# Patient Record
Sex: MALE | Race: BLACK OR AFRICAN AMERICAN | URBAN - METROPOLITAN AREA
[De-identification: names, ages, dates, MRNs, and addresses within clinical notes are randomized per-mention and may not be internally consistent; named-entity substitution may affect disease eponyms.]

---

## 2023-01-25 ENCOUNTER — INPATIENT (INPATIENT)
Facility: HOSPITAL | Age: 28
LOS: 7 days | Discharge: PSYCHIATRIC OR SUBSTANCE USE FACILITY | End: 2023-02-02
Attending: PSYCHIATRY & NEUROLOGY | Admitting: PSYCHIATRY & NEUROLOGY
Payer: MEDICAID

## 2023-01-25 VITALS — WEIGHT: 169.09 LBS | HEIGHT: 67 IN | TEMPERATURE: 98 F

## 2023-01-25 DIAGNOSIS — F20.1 DISORGANIZED SCHIZOPHRENIA: ICD-10-CM

## 2023-01-25 PROCEDURE — 99221 1ST HOSP IP/OBS SF/LOW 40: CPT

## 2023-01-25 RX ORDER — ACETAMINOPHEN 500 MG
650 TABLET ORAL EVERY 6 HOURS
Refills: 0 | Status: DISCONTINUED | OUTPATIENT
Start: 2023-01-25 | End: 2023-02-02

## 2023-01-25 RX ORDER — OLANZAPINE 15 MG/1
5 TABLET, FILM COATED ORAL ONCE
Refills: 0 | Status: DISCONTINUED | OUTPATIENT
Start: 2023-01-25 | End: 2023-02-02

## 2023-01-25 RX ORDER — RISPERIDONE 4 MG/1
4 TABLET ORAL AT BEDTIME
Refills: 0 | Status: DISCONTINUED | OUTPATIENT
Start: 2023-01-25 | End: 2023-01-30

## 2023-01-25 RX ORDER — OLANZAPINE 15 MG/1
5 TABLET, FILM COATED ORAL EVERY 6 HOURS
Refills: 0 | Status: DISCONTINUED | OUTPATIENT
Start: 2023-01-25 | End: 2023-02-02

## 2023-01-25 RX ORDER — BENZOCAINE AND MENTHOL 5; 1 G/100ML; G/100ML
1 LIQUID ORAL EVERY 4 HOURS
Refills: 0 | Status: DISCONTINUED | OUTPATIENT
Start: 2023-01-25 | End: 2023-02-02

## 2023-01-25 RX ORDER — TRAZODONE HCL 50 MG
50 TABLET ORAL AT BEDTIME
Refills: 0 | Status: DISCONTINUED | OUTPATIENT
Start: 2023-01-25 | End: 2023-02-02

## 2023-01-25 RX ORDER — SODIUM CHLORIDE 0.65 %
1 AEROSOL, SPRAY (ML) NASAL EVERY 4 HOURS
Refills: 0 | Status: DISCONTINUED | OUTPATIENT
Start: 2023-01-25 | End: 2023-02-02

## 2023-01-25 RX ADMIN — Medication 50 MILLIGRAM(S): at 20:39

## 2023-01-25 RX ADMIN — RISPERIDONE 4 MILLIGRAM(S): 4 TABLET ORAL at 20:39

## 2023-01-25 NOTE — BH INPATIENT PSYCHIATRY ASSESSMENT NOTE - RISK ASSESSMENT
Risk factors for self-harm / harm to others: sex, psychiatric diagnosis, substance use  Protective factors: inpatient and in a controlled setting with limited access to lethal means. not endorsing harm to self or others, able to contract for safety.

## 2023-01-25 NOTE — BH PATIENT PROFILE - NSBHTYPESAGG_PSY_A_CORE
pt reports hx C/A/H that tell him to kill people. Pt denies current C/A/H, denies intent or plan/thought

## 2023-01-25 NOTE — BH INPATIENT PSYCHIATRY ASSESSMENT NOTE - NSBHCHARTREVIEWVS_PSY_A_CORE FT
Vital Signs Last 24 Hrs  T(C): 36.7 (01-25-23 @ 16:14), Max: 36.7 (01-25-23 @ 16:14)  T(F): 98.1 (01-25-23 @ 16:14), Max: 98.1 (01-25-23 @ 16:14)  HR: --  BP: --  BP(mean): --  RR: --  SpO2: --    Orthostatic VS  01-25-23 @ 16:14  Lying BP: --/-- HR: --  Sitting BP: 129/83 HR: 82  Standing BP: 142/87 HR: 90  Site: --  Mode: --

## 2023-01-25 NOTE — BH INPATIENT PSYCHIATRY ASSESSMENT NOTE - MSE UNSTRUCTURED FT
Appearance: Dressed appropriately in casual clothing. fair hygiene + grooming .  Attitude / Behavior: Cooperative. cooperative. respectful.   Relatedness: fair   Eye contact: poor. pt looks down through most of the interview.   Motor: No abnormal movements, no psychomotor slowing or activation.  Speech: Regular rate. soft. at times with echolalia   Mood: "fine"   Affect: flat   Thought Process: concrete. at times illogical.    Thought Content: denies SI and HI   Perceptual: endorses AH, denies command type. denies VH. + paranoia   Insight: partial   Judgment: fair   Impulse control: appropriate thus far     Attention: fair  Gait: Intact.

## 2023-01-25 NOTE — BH INPATIENT PSYCHIATRY ASSESSMENT NOTE - HPI (INCLUDE ILLNESS QUALITY, SEVERITY, DURATION, TIMING, CONTEXT, MODIFYING FACTORS, ASSOCIATED SIGNS AND SYMPTOMS)
28 yo male, schizophrenia, domiciled with father, unemployed, prior hospitalizations, no known SA, denies medical hx, hx of extensive cannabis use, presented initially to [facility] after being found faced down in the woods, found to be psychotic, transferred to [facility]. Pt now transferred to [facility] due to testing + for COVID. At [facility], patient was described as cooperative, disorganized, making bizarre statements including “demons are telling [him] to do things.” Patient also noted an increase in his alcohol consumption. He endorsed AH that were intermittently command type to hurt others. Per reports received, patient’s father was hospitalized due to seizure and patient thought his father . Patient was started on Risperdal, titrated to 4mg qhs and pt agreed to trial of Invega Sustenna. Per handoff, prior auth obtained by [facility] for Invega Sustenna. On initial presentation to [facility], patient reports being found outside and being brought to [facility]. He is disorganized in his responses, states that he was very worried that his father had , but was recently reassured that he is actually alive. He reports a dx oh Schizophrenia, endorses cannabis use, reports having AH mostly “of random things,” but chart report suggests past CAH to hurt others. Patient adherent to Risperdal, was receiving 4mg qhs at [facility]. Patient also endorsing feeling nervous and some paranoia. Pt denies manic sx. Denies depressive sx. Denies si and hi. Reports casual alcohol use. denies withdrawals, past detox or rehabs.  28 yo male, schizophrenia, domiciled with father, unemployed, prior hospitalizations, no known SA, denies medical hx, hx of extensive cannabis use, presented initially to [facility] after being found faced down in the woods, found to be psychotic, transferred to [facility]. Pt now transferred to [facility] due to testing + for COVID. At [facility], patient was described as cooperative, disorganized, making bizarre statements including “demons are telling [him] to do things.” Patient also noted an increase in his alcohol consumption. He endorsed AH that were intermittently command type to hurt others. Per reports received, patient’s father was hospitalized due to seizure and patient thought his father . Patient was started on Risperdal, titrated to 4mg qhs and pt agreed to trial of Invega Sustenna. Per handoff, prior auth obtained by [facility] for Invega Sustenna. On initial presentation to [facility], patient reports being found outside and being brought to [facility]. He is disorganized in his responses, states that he was very worried that his father had , but was recently reassured that he is actually alive. He reports a dx of Schizophrenia, endorses cannabis use, reports having AH mostly “of random things,” but chart report suggests past CAH to hurt others. Patient adherent to Risperdal, was receiving 4mg qhs at [facility]. Patient also endorsing feeling nervous and some paranoia. Pt denies manic sx. Denies depressive sx. Denies si and hi. Reports casual alcohol use. denies withdrawals, past detox or rehabs.

## 2023-01-25 NOTE — BH INPATIENT PSYCHIATRY ASSESSMENT NOTE - CURRENT MEDICATION
MEDICATIONS  (STANDING):  risperiDONE   Tablet 4 milliGRAM(s) Oral at bedtime    MEDICATIONS  (PRN):  traZODone 50 milliGRAM(s) Oral at bedtime PRN insomnia

## 2023-01-25 NOTE — CHART NOTE - NSCHARTNOTEFT_GEN_A_CORE
Screening Medical Evaluation  Patient Admitted from: [facility] admitting diagnosis: Disorganized schizophrenia    PAST MEDICAL & SURGICAL HISTORY:  No pertinent past medical history      No significant past surgical history            Allergies    No Known Allergies    Intolerances        Social History:     FAMILY HISTORY:      MEDICATIONS  (STANDING):  risperiDONE   Tablet 4 milliGRAM(s) Oral at bedtime    MEDICATIONS  (PRN):  acetaminophen     Tablet .. 650 milliGRAM(s) Oral every 6 hours PRN Temp greater or equal to 38C (100.4F), Mild Pain (1 - 3), Moderate Pain (4 - 6)  benzocaine 15 mG/menthol 3.6 mG Lozenge 1 Lozenge Oral every 4 hours PRN sore throat  OLANZapine Disintegrating Tablet 5 milliGRAM(s) Oral every 6 hours PRN psychotic agitation  OLANZapine Injectable 5 milliGRAM(s) IntraMuscular once PRN psychotic agitation  sodium chloride 0.65% Nasal 1 Spray(s) Both Nostrils every 4 hours PRN Nasal Congestion  traZODone 50 milliGRAM(s) Oral at bedtime PRN insomnia      Vital Signs Last 24 Hrs  T(C): 36.7 (25 Jan 2023 16:14), Max: 36.7 (25 Jan 2023 16:14)  T(F): 98.1 (25 Jan 2023 16:14), Max: 98.1 (25 Jan 2023 16:14)  HR: 82  BP: 129/83  BP(mean): --  RR: 18  SpO2: --      CAPILLARY BLOOD GLUCOSE            PHYSICAL EXAM:  GENERAL: NAD, well-developed  HEAD:  Atraumatic, Normocephalic  EYES: EOMI, PERRLA, conjunctiva and sclera clear  NECK: Supple, No JVD  CHEST/LUNG: Clear to auscultation bilaterally; No wheeze  HEART: Regular rate and rhythm; No murmurs, rubs, or gallops  ABDOMEN: Soft, Nontender, Nondistended; Bowel sounds present  EXTREMITIES:  2+ Peripheral Pulses, No cyanosis, or edema  PSYCH: AAOx3  NEUROLOGY: non-focal  SKIN: No rashes or lesions    LABS:                    RADIOLOGY & ADDITIONAL TESTS:    Assessment and Plan:  27 year old male presenting today from [facility] to [facility] with admitting diagnosis of Disorganized schizophreniawith no pertinent PMH. Denies any fever, headaches, chills, chest pain, SOB, abdominal pain, N/V/D/C, dysuria. Physical exam unremarkable.  1)Disorganized schizophrenia: Follow care plan as per primary team.

## 2023-01-25 NOTE — BH INPATIENT PSYCHIATRY ASSESSMENT NOTE - NSICDXBHPRIMARYDX_PSY_ALL_CORE
[FreeTextEntry1] : follow up with surgeon  and oncology\par \par abd. pelvic ct  ordered\par \par monitor symptoms Schizophrenia   F20.9

## 2023-01-25 NOTE — BH PATIENT PROFILE - NSSUBRIEFINTERVENTION_PSY_A_CORE
If the patient has condition or takes medication with contraindications to drinking or substance use, it is recommended to abstain from drinking or substance use, or to drink or use substances below the recommended limits.

## 2023-01-25 NOTE — BH INPATIENT PSYCHIATRY ASSESSMENT NOTE - NSBHASSESSSUMMFT_PSY_ALL_CORE
26 yo male, schizophrenia, domiciled with father, unemployed, prior hospitalizations, no known SA, denies medical hx, hx of extensive cannabis use, presented initially to [facility] after being found faced down in the woods, found to be psychotic, transferred to [facility]. Pt now transferred to [facility] due to testing + for COVID.     Patient continues to endorse paranoia, AH, denies command type, has a flat affect, concrete thought process. patient on Risperdal 4mg qhs and will continue this. no CO indicated at this time. will explore VASQUEZ with patient (pt per chart review appears to have agreed to Invega Sustenna). hospitalization to assure safety, optimize treatment, coordinate aftercare. supportive tx for COVID sx ordered.      q15 min obs   Risperdal 4mg qhs   Olanzapine PRN for psychosis   trazodone PRN for insomnia

## 2023-01-26 DIAGNOSIS — U07.1 COVID-19: ICD-10-CM

## 2023-01-26 LAB
ALBUMIN SERPL ELPH-MCNC: 4.2 G/DL — SIGNIFICANT CHANGE UP (ref 3.3–5)
ALP SERPL-CCNC: 70 U/L — SIGNIFICANT CHANGE UP (ref 40–120)
ALT FLD-CCNC: 27 U/L — SIGNIFICANT CHANGE UP (ref 4–41)
ANION GAP SERPL CALC-SCNC: 10 MMOL/L — SIGNIFICANT CHANGE UP (ref 7–14)
AST SERPL-CCNC: 32 U/L — SIGNIFICANT CHANGE UP (ref 4–40)
BASOPHILS # BLD AUTO: 0.04 K/UL — SIGNIFICANT CHANGE UP (ref 0–0.2)
BASOPHILS NFR BLD AUTO: 0.7 % — SIGNIFICANT CHANGE UP (ref 0–2)
BILIRUB SERPL-MCNC: 1.1 MG/DL — SIGNIFICANT CHANGE UP (ref 0.2–1.2)
BUN SERPL-MCNC: 12 MG/DL — SIGNIFICANT CHANGE UP (ref 7–23)
CALCIUM SERPL-MCNC: 9.1 MG/DL — SIGNIFICANT CHANGE UP (ref 8.4–10.5)
CHLORIDE SERPL-SCNC: 101 MMOL/L — SIGNIFICANT CHANGE UP (ref 98–107)
CO2 SERPL-SCNC: 28 MMOL/L — SIGNIFICANT CHANGE UP (ref 22–31)
CREAT SERPL-MCNC: 0.99 MG/DL — SIGNIFICANT CHANGE UP (ref 0.5–1.3)
EGFR: 106 ML/MIN/1.73M2 — SIGNIFICANT CHANGE UP
EOSINOPHIL # BLD AUTO: 0.07 K/UL — SIGNIFICANT CHANGE UP (ref 0–0.5)
EOSINOPHIL NFR BLD AUTO: 1.3 % — SIGNIFICANT CHANGE UP (ref 0–6)
GLUCOSE SERPL-MCNC: 91 MG/DL — SIGNIFICANT CHANGE UP (ref 70–99)
HCT VFR BLD CALC: 42.9 % — SIGNIFICANT CHANGE UP (ref 39–50)
HGB BLD-MCNC: 13.7 G/DL — SIGNIFICANT CHANGE UP (ref 13–17)
IANC: 2.59 K/UL — SIGNIFICANT CHANGE UP (ref 1.8–7.4)
IMM GRANULOCYTES NFR BLD AUTO: 0.7 % — SIGNIFICANT CHANGE UP (ref 0–0.9)
LYMPHOCYTES # BLD AUTO: 2.26 K/UL — SIGNIFICANT CHANGE UP (ref 1–3.3)
LYMPHOCYTES # BLD AUTO: 42.2 % — SIGNIFICANT CHANGE UP (ref 13–44)
MCHC RBC-ENTMCNC: 30.5 PG — SIGNIFICANT CHANGE UP (ref 27–34)
MCHC RBC-ENTMCNC: 31.9 GM/DL — LOW (ref 32–36)
MCV RBC AUTO: 95.5 FL — SIGNIFICANT CHANGE UP (ref 80–100)
MONOCYTES # BLD AUTO: 0.36 K/UL — SIGNIFICANT CHANGE UP (ref 0–0.9)
MONOCYTES NFR BLD AUTO: 6.7 % — SIGNIFICANT CHANGE UP (ref 2–14)
NEUTROPHILS # BLD AUTO: 2.59 K/UL — SIGNIFICANT CHANGE UP (ref 1.8–7.4)
NEUTROPHILS NFR BLD AUTO: 48.4 % — SIGNIFICANT CHANGE UP (ref 43–77)
NRBC # BLD: 0 /100 WBCS — SIGNIFICANT CHANGE UP (ref 0–0)
NRBC # FLD: 0 K/UL — SIGNIFICANT CHANGE UP (ref 0–0)
PLATELET # BLD AUTO: 246 K/UL — SIGNIFICANT CHANGE UP (ref 150–400)
POTASSIUM SERPL-MCNC: 4.2 MMOL/L — SIGNIFICANT CHANGE UP (ref 3.5–5.3)
POTASSIUM SERPL-SCNC: 4.2 MMOL/L — SIGNIFICANT CHANGE UP (ref 3.5–5.3)
PROT SERPL-MCNC: 7 G/DL — SIGNIFICANT CHANGE UP (ref 6–8.3)
RBC # BLD: 4.49 M/UL — SIGNIFICANT CHANGE UP (ref 4.2–5.8)
RBC # FLD: 11.3 % — SIGNIFICANT CHANGE UP (ref 10.3–14.5)
SODIUM SERPL-SCNC: 139 MMOL/L — SIGNIFICANT CHANGE UP (ref 135–145)
WBC # BLD: 5.36 K/UL — SIGNIFICANT CHANGE UP (ref 3.8–10.5)
WBC # FLD AUTO: 5.36 K/UL — SIGNIFICANT CHANGE UP (ref 3.8–10.5)

## 2023-01-26 PROCEDURE — 99231 SBSQ HOSP IP/OBS SF/LOW 25: CPT

## 2023-01-26 PROCEDURE — 90853 GROUP PSYCHOTHERAPY: CPT

## 2023-01-26 PROCEDURE — 90832 PSYTX W PT 30 MINUTES: CPT | Mod: 59

## 2023-01-26 RX ORDER — PALIPERIDONE 1.5 MG/1
156 TABLET, EXTENDED RELEASE ORAL ONCE
Refills: 0 | Status: COMPLETED | OUTPATIENT
Start: 2023-02-02 | End: 2023-02-02

## 2023-01-26 RX ORDER — PALIPERIDONE 1.5 MG/1
234 TABLET, EXTENDED RELEASE ORAL ONCE
Refills: 0 | Status: COMPLETED | OUTPATIENT
Start: 2023-01-27 | End: 2023-01-27

## 2023-01-26 RX ADMIN — RISPERIDONE 4 MILLIGRAM(S): 4 TABLET ORAL at 20:28

## 2023-01-26 NOTE — BH SOCIAL WORK INITIAL PSYCHOSOCIAL EVALUATION - OTHER PAST PSYCHIATRIC HISTORY (INCLUDE DETAILS REGARDING ONSET, COURSE OF ILLNESS, INPATIENT/OUTPATIENT TREATMENT)
Per EMR pt has a hx of schizophrenia Per EMR pt has a hx of schizophrenia, paranoia, substance abuse, had 1 prior inpt hospitalization in 2019 for CAH, pt reports was connected to outpatient care, last seen with TAVARES with a provider MD and therapist several months ago. Per EMR pt has no hx of SA/SI, has a hx of AH, no VH, has paranoid delusions, hears voices, command telling him to hurt others but never acted upon them, pt has a hx of substance abuse, cannabis & ETOH abuse, has never completed an inpt rehab program, pt reports struggling with tx compliancy, has no hx of violent aggression, has a hx of impulsivity, poor judgement, pt has a legal hx of grand larceny during adolescence, has no medical hx

## 2023-01-26 NOTE — BH PSYCHOLOGY - GROUP THERAPY NOTE - NSPSYCHOLGRPCOGPT_PSY_A_CORE FT
Patient attended Cognitive Behavioral Therapy Group incorporating ACT-based concepts. The group started with a brief check-in, discussion about a mindfulness quote, followed by a mindful breath/body exercise, members were then encouraged to share their thoughts/reactions to this. Group focused on discussing the function/purpose of emotions; exploring how emotions motivate and organize us for action, communicate to/influence others and to ourselves. Group facilitator explained concepts, reinforced participation, and engaged patients in the discussion.

## 2023-01-26 NOTE — PSYCHIATRIC REHAB INITIAL EVALUATION - NSBHALCSUBTREAT_PSY_ALL_CORE
1 prior inpt hospitalization in 2019 for CAH, pt reports was connected to outpatient care, last seen with TAVARES with a provider MD and therapist several months ago.

## 2023-01-26 NOTE — BH PSYCHOLOGY - GROUP THERAPY NOTE - NSPSYCHOLGRPCOGINT_PSY_A_CORE FT
Cognitive/behavioral therapy, Acceptance and Commitment therapy, Emotion regulation/coping skills taught, Psychoed

## 2023-01-26 NOTE — PSYCHIATRIC REHAB INITIAL EVALUATION - NSBHPRRECOMMEND_PSY_ALL_CORE
Patient was seen individually by psych rehab staff to orient him to the unit and introduce him to psych rehab department and its functions as well as to assess functioning. Upon approach, patient was willing to meet with psych rehab staff. Patient was provided with a unit schedule and encouraged to attend daily psychiatric rehabilitation groups for improved insight and symptom management. Patient’s appearance was kempt and was observed to be dressed casually. Patient presents as calm and pleasant. Patient was engaged and cooperative. Patient was noted to be disorganized at times, but demonstrated partial insight and future-oriented thinking such as stating that he'd like to get  one day. When asked what brought him to the facility, patient stated that he thought his dad  because he had a heart attack and this combined with not taking his medication resulted in him "acting out", resulting in him being found face down in the woods. This corroborates the psychiatry assessment note which states that patient is a "28 yo male, schizophrenia, domiciled with father, unemployed, prior hospitalizations, no known SA, denies medical hx, hx of extensive cannabis use, presented initially to [facility] after being found faced down in the woods, found to be psychotic, transferred to [facility]. Pt now transferred to [facility] due to testing + for COVID. At [facility], patient was described as cooperative, disorganized, making bizarre statements including “demons are telling [him] to do things.” Patient also noted an increase in his alcohol consumption. He endorsed AH that were intermittently command type to hurt others. Per reports received, patient’s father was hospitalized due to seizure and patient thought his father . Patient was started on Risperdal, titrated to 4mg qhs and pt agreed to trial of Invega Sustenna. Per handoff, prior auth obtained by [facility] for Invega Sustenna. On initial presentation to [facility], patient reports being found outside and being brought to [facility]. He is disorganized in his responses, states that he was very worried that his father had , but was recently reassured that he is actually alive". Patient and psych were unable to identify a collaborative goal, therefore one was selected for the patient. Psych rehab staff will provide counseling and daily group therapy to assist patient in achieving therapeutic goals. Psych rehab staff will also continue to engage patient daily in order to build therapeutic rapport. Patient denies SI/HI/VH, but endorses AH. Patient stated the nature of his AH at this given time are not CAH, and are a mix of "good and bad voices" and when they are "bad" he doesn't listen to them. Psych rehab recommends that patient attend individual and group therapy for support, psycho-education and skill-integration as well as to facilitate progress towards specified goal.

## 2023-01-26 NOTE — BH PSYCHOLOGY - GROUP THERAPY NOTE - NSBHPSYCHOLRESPCOMMENT_PSY_A_CORE FT
The patient appeared adequately groomed and casually dressed. Pt appeared very engaged in the group as evidenced by his willingness to verbally communicate independently during the discussion. Pt read aloud from the worksheet and shared that he feels validated when learning about other’s experiencing similar emotions. Pt highlighted the ways in which people communicate their feelings non-verbally and how he is learning to “trust [his] gut” regarding reactions he has to others. Pt was supportive of members and agreed with much of what the group shared.  Speech was WNL. PT was oriented X3. Pt was appropriate with peers.

## 2023-01-27 PROCEDURE — 99231 SBSQ HOSP IP/OBS SF/LOW 25: CPT

## 2023-01-27 RX ADMIN — PALIPERIDONE 234 MILLIGRAM(S): 1.5 TABLET, EXTENDED RELEASE ORAL at 09:53

## 2023-01-27 RX ADMIN — RISPERIDONE 4 MILLIGRAM(S): 4 TABLET ORAL at 21:25

## 2023-01-27 NOTE — BH TREATMENT PLAN - NSTXDCOPNOINTERSW_PSY_ALL_CORE
SW met with pt, provided psycho-ed in context of realizing the importance of tx compliance for better functioning, pt shared recent stressors have been a trigger, SW encouraged pt to participate in groups on the unit to help promote better insight

## 2023-01-27 NOTE — BH TREATMENT PLAN - NSTXDCOPLKINTERSW_PSY_ALL_CORE
SW spoke with pt to discuss recent challenges he's been experiencing, pt shares he hasn't been connected to tx for the past several months, has stressors at home which has hiim dysregulated, asked for assistance with re-linking to outpt care

## 2023-01-27 NOTE — BH TREATMENT PLAN - NSTXPSYCHOINTERPR_PSY_ALL_CORE
Psych rehab recommends that patient attend individual and group therapy for support, psycho-education and skill-integration as well as to facilitate progress towards specified goal.

## 2023-01-27 NOTE — BH TREATMENT PLAN - NSTXPSYCHOGOAL_PSY_ALL_CORE
Will be able to report experiencing hallucinations to staff
Will verbalize 1 strategy to successfully cope with psychotic symptoms

## 2023-01-27 NOTE — BH TREATMENT PLAN - NSTXPSYCHOINTERRN_PSY_ALL_CORE
Assess pt for signs and symptoms psychosis and command auditory hallucinations, redirect and reorient as necessary, provide support, maintain safety, explore healthy coping skills, identify triggers, encourage pt to participate in groups and unit activities, administer and educate on ordered medications

## 2023-01-28 PROCEDURE — 99232 SBSQ HOSP IP/OBS MODERATE 35: CPT

## 2023-01-28 RX ADMIN — RISPERIDONE 4 MILLIGRAM(S): 4 TABLET ORAL at 20:10

## 2023-01-29 PROCEDURE — 99232 SBSQ HOSP IP/OBS MODERATE 35: CPT

## 2023-01-29 RX ADMIN — RISPERIDONE 4 MILLIGRAM(S): 4 TABLET ORAL at 20:23

## 2023-01-30 PROCEDURE — 99231 SBSQ HOSP IP/OBS SF/LOW 25: CPT

## 2023-01-30 PROCEDURE — 90832 PSYTX W PT 30 MINUTES: CPT

## 2023-01-30 RX ORDER — RISPERIDONE 4 MG/1
2 TABLET ORAL AT BEDTIME
Refills: 0 | Status: DISCONTINUED | OUTPATIENT
Start: 2023-01-30 | End: 2023-02-01

## 2023-01-30 RX ADMIN — RISPERIDONE 2 MILLIGRAM(S): 4 TABLET ORAL at 20:28

## 2023-01-31 LAB — SARS-COV-2 RNA SPEC QL NAA+PROBE: SIGNIFICANT CHANGE UP

## 2023-01-31 PROCEDURE — 99231 SBSQ HOSP IP/OBS SF/LOW 25: CPT

## 2023-01-31 RX ADMIN — RISPERIDONE 2 MILLIGRAM(S): 4 TABLET ORAL at 20:48

## 2023-02-01 LAB — SARS-COV-2 RNA SPEC QL NAA+PROBE: SIGNIFICANT CHANGE UP

## 2023-02-01 PROCEDURE — 90832 PSYTX W PT 30 MINUTES: CPT

## 2023-02-01 PROCEDURE — 99231 SBSQ HOSP IP/OBS SF/LOW 25: CPT

## 2023-02-01 RX ORDER — TRAZODONE HCL 50 MG
1 TABLET ORAL
Qty: 0 | Refills: 0 | DISCHARGE
Start: 2023-02-01

## 2023-02-01 RX ORDER — PALIPERIDONE 1.5 MG/1
156 TABLET, EXTENDED RELEASE ORAL
Qty: 1 | Refills: 0
Start: 2023-02-01

## 2023-02-01 RX ORDER — RISPERIDONE 4 MG/1
2 TABLET ORAL AT BEDTIME
Refills: 0 | Status: COMPLETED | OUTPATIENT
Start: 2023-02-01 | End: 2023-02-01

## 2023-02-01 RX ADMIN — RISPERIDONE 2 MILLIGRAM(S): 4 TABLET ORAL at 20:43

## 2023-02-01 NOTE — BH INPATIENT PSYCHIATRY DISCHARGE NOTE - NSDCMRMEDTOKEN_GEN_ALL_CORE_FT
Padmini Sustenna 156 mg/mL intramuscular suspension, extended release: 156 milligram(s) intramuscularly every 28 days. NEXT DUE 3/2/23   traZODone 50 mg oral tablet: 1 tab(s) orally once a day (at bedtime), As needed, insomnia

## 2023-02-01 NOTE — BH INPATIENT PSYCHIATRY DISCHARGE NOTE - NSBHDCRISKMITIGATE_PSY_ALL_CORE
Safety planning/Reduction in access to lethal methods (pills, firearms, etc)/Family/Other social support involvement/Long acting injectable medication/Medications targeting suicidality/non-suicidal self injurious behavior

## 2023-02-01 NOTE — BH INPATIENT PSYCHIATRY DISCHARGE NOTE - NSDCCPCAREPLAN_GEN_ALL_CORE_FT
PRINCIPAL DISCHARGE DIAGNOSIS  Diagnosis: Schizophrenia  Assessment and Plan of Treatment: Padmini Mckeon

## 2023-02-01 NOTE — BH DISCHARGE NOTE NURSING/SOCIAL WORK/PSYCH REHAB - NSDPFAC_GEN_ALL_CORE
[facility] - Unit SW2

## 2023-02-01 NOTE — BH PSYCHOLOGY - CLINICIAN PSYCHOTHERAPY NOTE - NSBHPSYCHOLNARRATIVE_PSY_A_CORE FT
Writer and Pt wore masks due to COVID precautions. Pt was alert and presented with fair hygiene and grooming. Pt reported that he was feeling “very good”, evidencing euthymic mood and congruent affect. Pt denied feeling anxious or depressed. Pt endorsed occasional AH, adding that he experiences these more as “good and bad thoughts” and does not engage with/pay attention to the more negative thoughts. Pt denied experiencing A/VH hallucinations during the session. His thought process was linear and goal directed. Speech was WNL; content was relevant to discussion, no paranoia evidenced. Pt denied current suicidal ideation, plan or intent. Pt did not endorse HI. Oriented x3. Fair insight and judgement demonstrated.      Session focused on discussing Pt’s thoughts/behaviors that bring him toward or away from his goals and values and distinguishing between the two. Pt highlighted his alcohol use as “holding [him] back” from engaging in self-care, adding that he strives to maintain a strong connection to his ania and to God, but finds it more difficult to do this when using substances. Writer and Pt explored the challenges of taking care of both his mind and body when more symptomatic, the importance of treating any physical illness, avoiding mood-altering substances, having balanced sleeping and eating habits, engaging in physical exercise, and brainstormed ways to organize a routine that incorporates these. Pt also identified that at times it can be intimidating to engage with health care providers, particularly when asking questions about medications and side effects. Writer provided psychoeducation about these medications (ex. injection vs. oral), how they work, and processed his past experiences of dealing with related stigma/judgment from friends. Writer provided Pt with support, validation, encouragement, and a safe space to share his thoughts and feelings. 
Writer and Pt wore masks due to COVID precautions. Pt was alert and presented with fair hygiene and grooming. Pt reported that he was feeling “better” detailing that he was moderately anxious and minimally depressed, feeling more comfortable on the unit, and socializing with other patients. Pt evidenced congruent affect. Pt denied experiencing A/V hallucinations during the session. His thought process was linear and goal directed. Speech was WNL; content was relevant to discussion. Pt denied current suicidal ideation, plan or intent. Pt did not endorse HI. Oriented x3. Fair insight and judgement demonstrated.      Session focused on discussing Pt’s marijuana and stimulant use through motivational interviewing techniques; particularly encouraging Pt to identify the function of the behavior, exploring the disadvantages of continuing use, advantages of change, optimism for change, and his intention to change. Writer and Pt discussed his use (among other less helpful coping strategies) as being a form of avoidance of uncomfortable internal experiences associated with anxiety. Pt expressed that much of this anxiety is directly related to social/interpersonal conflicts and school stressors, and that marijuana helps with his “social anxiety.” Pt also shared that he uses stimulants to help with productivity and focus better on schoolwork, while identifying that he has become overdependent on these substances and feels “much worse” when stopping them. Pt asserted that he hopes to more openly speak with his sister about their relationship and how drastically it has changed over the last year or so, adding that he doesn’t feel she fully understands or accepts the impacts that substances have had on his mental health. Pt detailed that his sister was the person who first introduced him to marijuana and that they often smoked together. Writer and Pt explored the ways he has tried considering the pressures she experiences from her high-stress career and how this unaddressed stress may be manifesting as anger toward him. Writer provided Pt with support, validation, encouragement, and a safe space to share his thoughts and feelings. 
Writer and Pt wore masks due to COVID precautions. Pt was alert and presented with fair hygiene and grooming. Pt reported that he was feeling “okay, but worried about [his] dad” reflecting on feeling safe and comfortable in the hospital but concerned about his father’s health. Pt denied feeling anxious or depressed. Pt evidenced euthymic mood, and congruent affect. Pt denied experiencing A/V hallucinations during the session. His thought process was linear and goal directed. Speech was WNL; content was relevant to discussion, no paranoia evidenced. Pt denied current suicidal ideation, plan or intent. Pt did not endorse HI. Oriented x3. Fair insight and judgement demonstrated.      Writer focused on establishing rapport with Pt and identifying his goals for treatment. Writer discussed parameters of treatment with Pt and discussed the limits of confidentiality. Session focused on exploring the stressors leading to his hospitalization. Pt detailed the impacts of his father’s relatively recent heart attack and additional health conditions which have been challenging for Pt. Pt noted that after speaking with his sister on the phone, and learning that his father is somewhat stable, he felt reassured. Pt explained that he has also found it difficult to remember to take his medications and would prefer an injection to taking pills. Writer and Pt discussed the impacts of using marijuana as a way of coping. Pt identified that it has been a “quick fix” but does not address what he is struggling with in the long term, reflecting on how this use impacts his AH and disorganization. Additionally, Pt expressed the role that his ania/spirituality plays in his life, adding that prayer and listening to gospel music is very helpful with tolerating uncomfortable internal experiences. Writer provided Pt with ACT workbook, along with support, validation, encouragement, and a safe space to share his thoughts and feelings.

## 2023-02-01 NOTE — BH INPATIENT PSYCHIATRY DISCHARGE NOTE - HPI (INCLUDE ILLNESS QUALITY, SEVERITY, DURATION, TIMING, CONTEXT, MODIFYING FACTORS, ASSOCIATED SIGNS AND SYMPTOMS)
26 yo male, schizophrenia, domiciled with father, unemployed, prior hospitalizations, no known SA, denies medical hx, hx of extensive cannabis use, presented initially to [facility] after being found faced down in the woods, found to be psychotic, transferred to [facility]. Pt now transferred to [facility] due to testing + for COVID. At [facility], patient was described as cooperative, disorganized, making bizarre statements including “demons are telling [him] to do things.” Patient also noted an increase in his alcohol consumption. He endorsed AH that were intermittently command type to hurt others. Per reports received, patient’s father was hospitalized due to seizure and patient thought his father . Patient was started on Risperdal, titrated to 4mg qhs and pt agreed to trial of Invega Sustenna. Per handoff, prior auth obtained by [facility] for Invega Sustenna. On initial presentation to [facility], patient reports being found outside and being brought to [facility]. He is disorganized in his responses, states that he was very worried that his father had , but was recently reassured that he is actually alive. He reports a dx of Schizophrenia, endorses cannabis use, reports having AH mostly “of random things,” but chart report suggests past CAH to hurt others. Patient adherent to Risperdal, was receiving 4mg qhs at [facility]. Patient also endorsing feeling nervous and some paranoia. Pt denies manic sx. Denies depressive sx. Denies si and hi. Reports casual alcohol use. denies withdrawals, past detox or rehabs.

## 2023-02-01 NOTE — BH DISCHARGE NOTE NURSING/SOCIAL WORK/PSYCH REHAB - DISCHARGE INSTRUCTIONS AFTERCARE APPOINTMENTS
In order to check the location, date, or time of your aftercare appointment, please refer to your Discharge Instructions Document given to you upon leaving the hospital.  If you have lost the instructions please call [phone]

## 2023-02-01 NOTE — BH DISCHARGE NOTE NURSING/SOCIAL WORK/PSYCH REHAB - NSCDUDCCRISIS_PSY_A_CORE
.  [facility] Response Crisis Hotline  [phone]  24 hour telephone crisis intervention and suicide prevention hotline concerned with all mental health issues/.  [facility]  [phone] [place] [address] [phone]   Hours:  Monday through Friday from 9 AM to 3 PM/.  [facility]  [phone] [place] [address]   [phone]   Hours: Monday through Friday from 10 AM to 4 PM

## 2023-02-01 NOTE — BH INPATIENT PSYCHIATRY DISCHARGE NOTE - OTHER PAST PSYCHIATRIC HISTORY (INCLUDE DETAILS REGARDING ONSET, COURSE OF ILLNESS, INPATIENT/OUTPATIENT TREATMENT)
Per EMR pt has a hx of schizophrenia, paranoia, substance abuse, had 1 prior inpt hospitalization in 2019 for CAH, pt reports was connected to outpatient care, last seen with [facility] with a provider MD and therapist several months ago. Per EMR pt has no hx of SA/SI, has a hx of AH, no VH, has paranoid delusions, hears voices, command telling him to hurt others but never acted upon them, pt has a hx of substance abuse, cannabis & ETOH abuse, has never completed an inpt rehab program, pt reports struggling with tx compliancy, has no hx of violent aggression, has a hx of impulsivity, poor judgement, pt has a legal hx of grand larceny during adolescence, has no medical hx

## 2023-02-01 NOTE — BH INPATIENT PSYCHIATRY DISCHARGE NOTE - NSBHDCHANDOFFFT_PSY_ALL_CORE
handoff and dc summary provided to [facility] inpatient unit. inpatient team can be contacted at [phone]

## 2023-02-01 NOTE — BH PSYCHOLOGY - CLINICIAN PSYCHOTHERAPY NOTE - NSBHPSYCHOLINT_PSY_A_CORE FT
Acceptance and Commitment therapy 

## 2023-02-01 NOTE — BH PSYCHOLOGY - CLINICIAN PSYCHOTHERAPY NOTE - TOKEN PULL-DIAGNOSIS
Primary Diagnosis:  Schizophrenia [F20.9]        Problem Dx:   COVID [U07.1]      

## 2023-02-01 NOTE — BH DISCHARGE NOTE NURSING/SOCIAL WORK/PSYCH REHAB - NSDCPRRECOMMEND_PSY_ALL_CORE
Carlos Montes(Attending)
Psych rehab recommends that patient receive psychoeducation to assist with symptoms, follow outpatient treatment as prescribed and be compliant with medication.

## 2023-02-01 NOTE — BH PSYCHOLOGY - CLINICIAN PSYCHOTHERAPY NOTE - NSBHPSYCHOLINT_PSY_A_CORE
Cognitive/behavioral therapy/Dialectical  Behavioral Therapy (DBT)/Dynamic issues addressed/Supported coping skills/Supportive therapy/other...

## 2023-02-01 NOTE — BH DISCHARGE NOTE NURSING/SOCIAL WORK/PSYCH REHAB - PATIENT PORTAL LINK FT
You can access the FollowMyHealth Patient Portal offered by  by registering at the following website: [email or web address]. By joining [facility]’s FollowMyHealth portal, you will also be able to view your health information using other applications (apps) compatible with our system.

## 2023-02-01 NOTE — BH DISCHARGE NOTE NURSING/SOCIAL WORK/PSYCH REHAB - NSDCPRGOAL_PSY_ALL_CORE
Upon approach, patient was pleasant and willing to meet with writer to discuss recovery progress. Patient was insightful and forthcoming throughout meeting. When asked about plans for after discharge, patient stated he plans to continue his care at [facility], and continue to use coping strategies such as spirituality to further improve his mental health. When asked to compare how he felt today to how he felt when first admitted, patient stated that "he feels good". Overall, patient was cooperative and feels optimistic about recovery. Patient denies VH/SI/HI, but endorses AH. Patient states that these voices are "good" and not the bad CAH he's had in the past. During hospitalization, patient was intermittently active in the milieu and attended some of groups. In groups patient was engaged and would participate frequently. Patient maintained good behavioral control on the unit and was compliant with treatment. Patient is kempt and has been seen dressed casually on the unit. Patient was compliant in safety planning and was provided with a Press-Ganey survey. By the end of hospitalization, patient did meet his goal of identifying 1 strategy to successfully cope with psychotic symptoms. Patient has made significant progress towards recovery, however still requires further care and discharge planning, therefore he will benefit from continuing treatment at [facility].

## 2023-02-02 VITALS — TEMPERATURE: 97 F | SYSTOLIC BLOOD PRESSURE: 135 MMHG | HEART RATE: 78 BPM | DIASTOLIC BLOOD PRESSURE: 88 MMHG

## 2023-02-02 PROCEDURE — 99231 SBSQ HOSP IP/OBS SF/LOW 25: CPT

## 2023-02-02 RX ADMIN — PALIPERIDONE 156 MILLIGRAM(S): 1.5 TABLET, EXTENDED RELEASE ORAL at 08:51

## 2023-02-02 NOTE — BH INPATIENT PSYCHIATRY PROGRESS NOTE - NSBHFUPINTERVALCCFT_PSY_A_CORE
follow up psychosis 	
follow up psychosis
reported feeling "alright"
follow up psychosis 
reported feeling "good"

## 2023-02-02 NOTE — BH INPATIENT PSYCHIATRY PROGRESS NOTE - NSBHMETABOLIC_PSY_ALL_CORE_FT
BMI:   HbA1c:   Glucose:   BP: 122/77 (01-31-23 @ 07:47) (122/77 - 122/77)  Lipid Panel: 
BMI:   HbA1c:   Glucose:   BP: 137/79 (01-26-23 @ 08:50) (137/79 - 137/79)  Lipid Panel: 
BMI:   HbA1c:   Glucose:   BP: 144/63 (01-28-23 @ 07:35) (144/63 - 144/63)  Lipid Panel: 
BMI:   HbA1c:   Glucose:   BP: 135/88 (02-02-23 @ 08:24) (122/77 - 135/88)  Lipid Panel: 
BMI:   HbA1c:   Glucose:   BP: 144/63 (01-28-23 @ 07:35) (137/79 - 144/63)  Lipid Panel: 
BMI:   HbA1c:   Glucose:   BP: 144/63 (01-28-23 @ 07:35) (144/63 - 144/63)  Lipid Panel: 
BMI:   HbA1c:   Glucose:   BP: 122/77 (01-31-23 @ 07:47) (122/77 - 122/77)  Lipid Panel: 
BMI:   HbA1c:   Glucose:   BP: 137/79 (01-26-23 @ 08:50) (137/79 - 137/79)  Lipid Panel:

## 2023-02-02 NOTE — BH INPATIENT PSYCHIATRY PROGRESS NOTE - NSBHFUPINTERVALHXFT_PSY_A_CORE
Chart reviewed including pertinent labs, imaging, ekg. case discussed with treatment team.  Over this interval: patient adherent to treatment. no behavioral issues in the milieu. Patient reports sleep is getting better. he states that he continues to have AH and that they are the "voice of god" telling him "good things." Patient denies command type hallucinations. denies SI and HI. pt amenable to second part of invega sustenna injection ordered for later this week 
Chart reviewed including pertinent labs, imaging, ekg. case discussed with treatment team.  Over this interval: patient in good behavioral control. he remains adherent to treatment without any reported side effects. pt continues to have AH, states they are "good voices," and non-command type. pt reports past experiences with "bad voices" but they usually occur when he is upset or frustrated, and they have not occurred in several weeks. pt finds medications to be helpful in assisting with his psychotic symptoms. he inquires about potential discharge soon and is amenable to either discharge or transfer back to [facility] and defers this to team to decide 
Chart reviewed including pertinent labs, imaging, ekg. case discussed with treatment team.  Over this interval: no behavioral issues. pt adjusting to milieu. he received Invega Sustenna 234mg IM today and tolerating well without complaints. he notes that he has not had AH this AM. Sleep fragmented per sleep log. counseling provided regarding proper sleep hygiene. pt visible in the milieu, respectful of peers and staff, attending groups. he is amenable to second part of loading Invega Sustenna next week 
f/up schizophrenia. VSS. calm, pleasant on approach. Patient continued to report ++AH, voices with Restorationism content, denied CAH to harm self/others. Patient reported fair sleep and appetite. no apparent distress. Patient tolerating medications, Denied SE. no td, eps or tremors observed/ reported. alert and oriented x 3; 
Chart reviewed including pertinent labs, imaging, ekg. case discussed with treatment team.  Over this interval: patient tested negative for COVID and now longer needs to quarantine and will be transferred to [facility] for continued treatment. pt received Invega Sustenna 156mg IM today tolerating well. pt endorses ongoing AH and says they are "positive voices," telling him "good things. Pt reports he is Zoroastrian and has been reciting prayers to preoccupy his time. denies VH. denies SI and HI. 
Chart reviewed including pertinent labs, imaging, ekg. case discussed with treatment team.  Over this interval: patient transferred from [facility] yesterday. he is adjusting to milieu. his face is beneath his sheets for the entirety of interview. he is calm. he is adherent to medications. he endorses ongoing AH states they are of "good things." He denies command type hallucinations and denies VH. denies thoughts of wanting to hurt himself or others. psychoed provided regarding invega sustenna. pt remains amenable to receiving it 
Chart reviewed including pertinent labs, imaging, ekg. case discussed with treatment team.  Over this interval: no behavioral issues. adherent to treatment. pt endorses  for "good voices" telling him positive affirmations. denies CAH. denies VH. denies suicidal ideation and hi. mood described as "okay." Sleep with early AM awakening. denies feelings of guilt, low energy. Patient amenable to transfer to [facility] for ongoing psychiatric care. he is due for loading Invega sustenna 156mg tomorrow and is in agreement with plan. he is visible in the milieu and keeps mostly to himself 
f/up schizophrenia. VSS. Patient reported++AH, voices stating "god is good", denied CAH to harm self/others. Patient reported fair sleep and appetite. no apparent distress. Patient tolerating medications, Denied SE. no td, eps or tremors observed/ reported. alert and oriented x 3;

## 2023-02-02 NOTE — BH INPATIENT PSYCHIATRY PROGRESS NOTE - NSDCCRITERIA_PSY_ALL_CORE
When pt is no longer an acute or imminent risk of harm to self or others, and is able to care for self safely, pt may then be discharged. 

## 2023-02-02 NOTE — BH INPATIENT PSYCHIATRY PROGRESS NOTE - NSTXDCOPLKINTERMD_PSY_ALL_CORE
coordinate with s/w 

## 2023-02-02 NOTE — BH INPATIENT PSYCHIATRY PROGRESS NOTE - NSBHATTESTBILLING_PSY_A_CORE
40005-Irxzrjrghr OBS or IP - low complexity OR 25-34 mins
50341-Knphdurkbn OBS or IP - low complexity OR 25-34 mins
21365-Bykedphlye OBS or IP - low complexity OR 25-34 mins
19303-Rnvopudkip OBS or IP - low complexity OR 25-34 mins
23535-Auuhpnvwiv OBS or IP - moderate complexity OR 35-49 mins
57751-Qahxmeiheb OBS or IP - low complexity OR 25-34 mins
84387-Azmqbekuli OBS or IP - moderate complexity OR 35-49 mins
76714-Bqahekxhue OBS or IP - low complexity OR 25-34 mins

## 2023-02-02 NOTE — BH INPATIENT PSYCHIATRY PROGRESS NOTE - NSTXDCOPNOINTERMD_PSY_ALL_CORE
coordinate with s/wVipin caputo 

## 2023-02-02 NOTE — BH INPATIENT PSYCHIATRY PROGRESS NOTE - NSBHCHARTREVIEWVS_PSY_A_CORE FT
Vital Signs Last 24 Hrs  T(C): 36.2 (02-02-23 @ 08:24), Max: 36.9 (02-01-23 @ 17:27)  T(F): 97.2 (02-02-23 @ 08:24), Max: 98.5 (02-01-23 @ 17:27)  HR: 78 (02-02-23 @ 08:24) (78 - 78)  BP: 135/88 (02-02-23 @ 08:24) (135/88 - 135/88)  BP(mean): --  RR: --  SpO2: --    Orthostatic VS  02-01-23 @ 07:54  Lying BP: --/-- HR: --  Sitting BP: 118/73 HR: 74  Standing BP: --/-- HR: --  Site: --  Mode: --  
Vital Signs Last 24 Hrs  T(C): 36.3 (01-31-23 @ 07:47), Max: 36.6 (01-30-23 @ 17:48)  T(F): 97.4 (01-31-23 @ 07:47), Max: 97.8 (01-30-23 @ 17:48)  HR: --  BP: 122/77 (01-31-23 @ 07:47) (122/77 - 122/77)  BP(mean): 66 (01-31-23 @ 07:47) (66 - 66)  RR: --  SpO2: --    Orthostatic VS  01-30-23 @ 07:47  Lying BP: --/-- HR: --  Sitting BP: 110/74 HR: 77  Standing BP: --/-- HR: --  Site: --  Mode: --  
Vital Signs Last 24 Hrs  T(C): 36.3 (01-26-23 @ 08:50), Max: 36.7 (01-25-23 @ 16:14)  T(F): 97.3 (01-26-23 @ 08:50), Max: 98.1 (01-25-23 @ 16:14)  HR: 86 (01-26-23 @ 08:50) (86 - 86)  BP: 137/79 (01-26-23 @ 08:50) (137/79 - 137/79)  BP(mean): --  RR: --  SpO2: --    Orthostatic VS  01-25-23 @ 16:14  Lying BP: --/-- HR: --  Sitting BP: 129/83 HR: 82  Standing BP: 142/87 HR: 90  Site: --  Mode: --  
Vital Signs Last 24 Hrs  T(C): 36.1 (02-01-23 @ 07:54), Max: 36.3 (01-31-23 @ 17:06)  T(F): 97 (02-01-23 @ 07:54), Max: 97.3 (01-31-23 @ 17:06)  HR: --  BP: --  BP(mean): --  RR: --  SpO2: --    Orthostatic VS  02-01-23 @ 07:54  Lying BP: --/-- HR: --  Sitting BP: 118/73 HR: 74  Standing BP: --/-- HR: --  Site: --  Mode: --  
Vital Signs Last 24 Hrs  T(C): 36.2 (01-27-23 @ 08:19), Max: 36.2 (01-27-23 @ 08:19)  T(F): 97.2 (01-27-23 @ 08:19), Max: 97.2 (01-27-23 @ 08:19)  HR: --  BP: --  BP(mean): --  RR: --  SpO2: --    Orthostatic VS  01-27-23 @ 08:19  Lying BP: --/-- HR: --  Sitting BP: 117/75 HR: 87  Standing BP: --/-- HR: --  Site: --  Mode: --  Orthostatic VS  01-25-23 @ 16:14  Lying BP: --/-- HR: --  Sitting BP: 129/83 HR: 82  Standing BP: 142/87 HR: 90  Site: --  Mode: --  
Vital Signs Last 24 Hrs  T(C): 36.1 (01-30-23 @ 07:47), Max: 36.9 (01-29-23 @ 17:17)  T(F): 96.9 (01-30-23 @ 07:47), Max: 98.5 (01-29-23 @ 17:17)  HR: --  BP: --  BP(mean): --  RR: --  SpO2: --    Orthostatic VS  01-30-23 @ 07:47  Lying BP: --/-- HR: --  Sitting BP: 110/74 HR: 77  Standing BP: --/-- HR: --  Site: --  Mode: --  Orthostatic VS  01-29-23 @ 07:46  Lying BP: --/-- HR: --  Sitting BP: 140/69 HR: 84  Standing BP: --/-- HR: --  Site: --  Mode: --  
Vital Signs Last 24 Hrs  T(C): 36.3 (01-28-23 @ 07:35), Max: 36.4 (01-27-23 @ 17:28)  T(F): 97.3 (01-28-23 @ 07:35), Max: 97.6 (01-27-23 @ 17:28)  HR: 86 (01-28-23 @ 07:35) (86 - 86)  BP: 144/63 (01-28-23 @ 07:35) (144/63 - 144/63)  BP(mean): --  RR: --  SpO2: --    Orthostatic VS  01-27-23 @ 08:19  Lying BP: --/-- HR: --  Sitting BP: 117/75 HR: 87  Standing BP: --/-- HR: --  Site: --  Mode: --  
Vital Signs Last 24 Hrs  T(C): 36.7 (01-29-23 @ 07:46), Max: 36.7 (01-29-23 @ 07:46)  T(F): 98.1 (01-29-23 @ 07:46), Max: 98.1 (01-29-23 @ 07:46)  HR: --  BP: --  BP(mean): --  RR: --  SpO2: --    Orthostatic VS  01-29-23 @ 07:46  Lying BP: --/-- HR: --  Sitting BP: 140/69 HR: 84  Standing BP: --/-- HR: --  Site: --  Mode: --

## 2023-02-02 NOTE — BH INPATIENT PSYCHIATRY PROGRESS NOTE - NSTXPSYCHOINTERMD_PSY_ALL_CORE
Padmini rowe 
risperdal 
Padmini rowe 
Padmini rowe 
risperdal 
Padmini rowe 

## 2023-02-02 NOTE — BH INPATIENT PSYCHIATRY PROGRESS NOTE - NSBHASSESSSUMMFT_PSY_ALL_CORE
28 yo male, schizophrenia, domiciled with father, unemployed, prior hospitalizations, no known SA, denies medical hx, hx of extensive cannabis use, presented initially to [facility] after being found faced down in the woods, found to be psychotic, transferred to [facility]. Pt now transferred to [facility] due to testing + for COVID.     pt adjusting to milieu. endorsing AH non-command type. flat affect. he is amenable to invega sustenna. will order 234mg invega sustenna for tomorrow       q15 min obs   Risperdal 4mg qhs   invega sustenna 234mg IM on 1/27/23 followed by 156mg on 2/1/23  Olanzapine PRN for psychosis   trazodone PRN for insomnia 
28 yo male, schizophrenia, domiciled with father, unemployed, prior hospitalizations, no known SA, denies medical hx, hx of extensive cannabis use, presented initially to [facility] after being found faced down in the woods, found to be psychotic, transferred to [facility]. Pt now transferred to [facility] due to testing + for COVID.     no behavioral issues. ongoing AH that he describes as "good" and non-command type. pt adherent to treatment. due for 2nd Invega Sustenna on 2/2/23. plan as below. will dc Risperdal once pt receives second Invega Sustenna injection       q15 min obs   Risperdal 2mg qhs PO  invega sustenna 156mg on 2/2/23  Olanzapine PRN for psychosis   trazodone PRN for insomnia 
28 yo male, schizophrenia, domiciled with father, unemployed, prior hospitalizations, no known SA, denies medical hx, hx of extensive cannabis use, presented initially to [facility] after being found faced down in the woods, found to be psychotic, transferred to [facility]. Pt now transferred to [facility] due to testing + for COVID.     received VASQUEZ Invega Sustenna 156mg today and tolerating well. ongoing AH. pt no longer needs to quarantine and will be transferred back to [facility] for ongoing treatment.       invega sustenna 156mg received on 2/2/23  
28 yo male, schizophrenia, domiciled with father, unemployed, prior hospitalizations, no known SA, denies medical hx, hx of extensive cannabis use, presented initially to [facility] after being found faced down in the woods, found to be psychotic, transferred to [facility]. Pt now transferred to [facility] due to testing + for COVID.     pt with improving sx of psychosis. sleep fragmented. pt received Invega Sustenna IM today. next due 2/2/23. will decrease Risperdal to 2mg as a bridge until next Invega Sustenna injection and then will d/c Risperdal PO    on assessment, patient in good behavioral control, reported AH, noncommand, reported sleeping well. no apparent distress.       q15 min obs   Risperdal 2mg qhs PO  invega sustenna 156mg on 2/2/23  Olanzapine PRN for psychosis   trazodone PRN for insomnia 
28 yo male, schizophrenia, domiciled with father, unemployed, prior hospitalizations, no known SA, denies medical hx, hx of extensive cannabis use, presented initially to [facility] after being found faced down in the woods, found to be psychotic, transferred to [facility]. Pt now transferred to [facility] due to testing + for COVID.     pt with improving sx of psychosis. sleep fragmented. pt received Invega Sustenna IM today. next due 2/2/23. will decrease Risperdal to 2mg as a bridge until next Invega Sustenna injection and then will d/c Risperdal PO    on assessment, patient in good behavioral control, reported AH, non-command, reported sleeping well. no apparent distress.       q15 min obs   Risperdal 2mg qhs PO  invega sustenna 156mg on 2/2/23  Olanzapine PRN for psychosis   trazodone PRN for insomnia 
28 yo male, schizophrenia, domiciled with father, unemployed, prior hospitalizations, no known SA, denies medical hx, hx of extensive cannabis use, presented initially to [facility] after being found faced down in the woods, found to be psychotic, transferred to [facility]. Pt now transferred to [facility] due to testing + for COVID.     pt with improving sx of psychosis. sleep fragmented. pt received Invega Sustenna IM today. next due 2/2/23. will decrease Risperdal to 2mg as a bridge until next Invega Sustenna injection and then will d/c Risperdal PO      q15 min obs   Risperdal 2mg qhs PO  invega sustenna 156mg on 2/2/23  Olanzapine PRN for psychosis   trazodone PRN for insomnia 
26 yo male, schizophrenia, domiciled with father, unemployed, prior hospitalizations, no known SA, denies medical hx, hx of extensive cannabis use, presented initially to [facility] after being found faced down in the woods, found to be psychotic, transferred to [facility]. Pt now transferred to [facility] due to testing + for COVID.     pt endorsing AH denies CAH. due for invega sustenna loading dose tomorrow. COVID test ordered for this AM and if negative, pt can come off quarantine. plan as below. tentative plans to transfer back to [facility] once pt no longer needs to quarantine. disposition planning with complexities related to patient's living situation - patient's father is appears to be terminally ill and pt lives with father. his father is in the hospital and patient's family is requesting referral for supportive housing       q15 min obs   Risperdal 2mg qhs PO  invega sustenna 156mg on 2/2/23  Olanzapine PRN for psychosis   trazodone PRN for insomnia 
28 yo male, schizophrenia, domiciled with father, unemployed, prior hospitalizations, no known SA, denies medical hx, hx of extensive cannabis use, presented initially to [facility] after being found faced down in the woods, found to be psychotic, transferred to [facility]. Pt now transferred to [facility] due to testing + for COVID.     pt with improving sx of psychosis. sleep fragmented. pt received Invega Sustenna IM today. next due 2/2/23. will decrease Risperdal to 2mg as a bridge until next Invega Sustenna injection and then will d/c Risperdal PO    continues to endorse non-command type AH. denies VH. he is in appropriate behavioral control. mostly to himself and likely with some negative symptoms. plan as below       q15 min obs   Risperdal 2mg qhs PO  invega sustenna 156mg on 2/2/23  Olanzapine PRN for psychosis   trazodone PRN for insomnia

## 2023-02-02 NOTE — BH INPATIENT PSYCHIATRY PROGRESS NOTE - CURRENT MEDICATION
MEDICATIONS  (STANDING):  risperiDONE   Tablet 2 milliGRAM(s) Oral at bedtime    MEDICATIONS  (PRN):  acetaminophen     Tablet .. 650 milliGRAM(s) Oral every 6 hours PRN Temp greater or equal to 38C (100.4F), Mild Pain (1 - 3), Moderate Pain (4 - 6)  benzocaine 15 mG/menthol 3.6 mG Lozenge 1 Lozenge Oral every 4 hours PRN sore throat  OLANZapine Disintegrating Tablet 5 milliGRAM(s) Oral every 6 hours PRN psychotic agitation  OLANZapine Injectable 5 milliGRAM(s) IntraMuscular once PRN psychotic agitation  sodium chloride 0.65% Nasal 1 Spray(s) Both Nostrils every 4 hours PRN Nasal Congestion  traZODone 50 milliGRAM(s) Oral at bedtime PRN insomnia  
MEDICATIONS  (STANDING):  risperiDONE   Tablet 4 milliGRAM(s) Oral at bedtime    MEDICATIONS  (PRN):  acetaminophen     Tablet .. 650 milliGRAM(s) Oral every 6 hours PRN Temp greater or equal to 38C (100.4F), Mild Pain (1 - 3), Moderate Pain (4 - 6)  benzocaine 15 mG/menthol 3.6 mG Lozenge 1 Lozenge Oral every 4 hours PRN sore throat  OLANZapine Disintegrating Tablet 5 milliGRAM(s) Oral every 6 hours PRN psychotic agitation  OLANZapine Injectable 5 milliGRAM(s) IntraMuscular once PRN psychotic agitation  sodium chloride 0.65% Nasal 1 Spray(s) Both Nostrils every 4 hours PRN Nasal Congestion  traZODone 50 milliGRAM(s) Oral at bedtime PRN insomnia  
MEDICATIONS  (STANDING):  risperiDONE   Tablet 2 milliGRAM(s) Oral at bedtime    MEDICATIONS  (PRN):  acetaminophen     Tablet .. 650 milliGRAM(s) Oral every 6 hours PRN Temp greater or equal to 38C (100.4F), Mild Pain (1 - 3), Moderate Pain (4 - 6)  benzocaine 15 mG/menthol 3.6 mG Lozenge 1 Lozenge Oral every 4 hours PRN sore throat  OLANZapine Disintegrating Tablet 5 milliGRAM(s) Oral every 6 hours PRN psychotic agitation  OLANZapine Injectable 5 milliGRAM(s) IntraMuscular once PRN psychotic agitation  sodium chloride 0.65% Nasal 1 Spray(s) Both Nostrils every 4 hours PRN Nasal Congestion  traZODone 50 milliGRAM(s) Oral at bedtime PRN insomnia  
MEDICATIONS  (STANDING):  risperiDONE   Tablet 4 milliGRAM(s) Oral at bedtime    MEDICATIONS  (PRN):  acetaminophen     Tablet .. 650 milliGRAM(s) Oral every 6 hours PRN Temp greater or equal to 38C (100.4F), Mild Pain (1 - 3), Moderate Pain (4 - 6)  benzocaine 15 mG/menthol 3.6 mG Lozenge 1 Lozenge Oral every 4 hours PRN sore throat  OLANZapine Disintegrating Tablet 5 milliGRAM(s) Oral every 6 hours PRN psychotic agitation  OLANZapine Injectable 5 milliGRAM(s) IntraMuscular once PRN psychotic agitation  sodium chloride 0.65% Nasal 1 Spray(s) Both Nostrils every 4 hours PRN Nasal Congestion  traZODone 50 milliGRAM(s) Oral at bedtime PRN insomnia  
MEDICATIONS  (STANDING):    MEDICATIONS  (PRN):  acetaminophen     Tablet .. 650 milliGRAM(s) Oral every 6 hours PRN Temp greater or equal to 38C (100.4F), Mild Pain (1 - 3), Moderate Pain (4 - 6)  benzocaine 15 mG/menthol 3.6 mG Lozenge 1 Lozenge Oral every 4 hours PRN sore throat  OLANZapine Disintegrating Tablet 5 milliGRAM(s) Oral every 6 hours PRN psychotic agitation  OLANZapine Injectable 5 milliGRAM(s) IntraMuscular once PRN psychotic agitation  sodium chloride 0.65% Nasal 1 Spray(s) Both Nostrils every 4 hours PRN Nasal Congestion  traZODone 50 milliGRAM(s) Oral at bedtime PRN insomnia  
MEDICATIONS  (STANDING):  risperiDONE   Tablet 4 milliGRAM(s) Oral at bedtime    MEDICATIONS  (PRN):  acetaminophen     Tablet .. 650 milliGRAM(s) Oral every 6 hours PRN Temp greater or equal to 38C (100.4F), Mild Pain (1 - 3), Moderate Pain (4 - 6)  benzocaine 15 mG/menthol 3.6 mG Lozenge 1 Lozenge Oral every 4 hours PRN sore throat  OLANZapine Disintegrating Tablet 5 milliGRAM(s) Oral every 6 hours PRN psychotic agitation  OLANZapine Injectable 5 milliGRAM(s) IntraMuscular once PRN psychotic agitation  sodium chloride 0.65% Nasal 1 Spray(s) Both Nostrils every 4 hours PRN Nasal Congestion  traZODone 50 milliGRAM(s) Oral at bedtime PRN insomnia  

## 2023-02-02 NOTE — BH INPATIENT PSYCHIATRY PROGRESS NOTE - NSTXPSYCHOGOAL_PSY_ALL_CORE
Will verbalize 1 strategy to successfully cope with psychotic symptoms
Will be able to report experiencing hallucinations to staff
Will verbalize 1 strategy to successfully cope with psychotic symptoms

## 2023-02-02 NOTE — BH INPATIENT PSYCHIATRY PROGRESS NOTE - PRN MEDS
MEDICATIONS  (PRN):  acetaminophen     Tablet .. 650 milliGRAM(s) Oral every 6 hours PRN Temp greater or equal to 38C (100.4F), Mild Pain (1 - 3), Moderate Pain (4 - 6)  benzocaine 15 mG/menthol 3.6 mG Lozenge 1 Lozenge Oral every 4 hours PRN sore throat  OLANZapine Disintegrating Tablet 5 milliGRAM(s) Oral every 6 hours PRN psychotic agitation  OLANZapine Injectable 5 milliGRAM(s) IntraMuscular once PRN psychotic agitation  sodium chloride 0.65% Nasal 1 Spray(s) Both Nostrils every 4 hours PRN Nasal Congestion  traZODone 50 milliGRAM(s) Oral at bedtime PRN insomnia  

## 2023-02-02 NOTE — BH INPATIENT PSYCHIATRY PROGRESS NOTE - NSBHCONSULTIPREASON_PSY_A_CORE
danger to self; mental illness expected to respond to inpatient care
other reason
danger to self; mental illness expected to respond to inpatient care

## 2023-02-02 NOTE — BH INPATIENT PSYCHIATRY PROGRESS NOTE - MSE UNSTRUCTURED FT
Appearance: Dressed appropriately in casual clothing. fair hygiene + grooming .  Attitude / Behavior: Cooperative. cooperative. pleasant.   Relatedness: fair   Eye contact: poor.   Motor: No abnormal movements, no psychomotor slowing or activation.  Speech: Regular rate. soft.   Mood: "fine"   Affect: flat   Thought Process: concrete. at times illogical.    Thought Content: denies SI and HI   Perceptual: denies AVH    Insight: partial   Judgment: improving   Impulse control: appropriate thus far     Attention: fair  Gait: Intact. 
Appearance: Dressed appropriately in casual clothing. fair hygiene + grooming .  Attitude / Behavior: Cooperative. cooperative. respectful.   Relatedness: fair   Eye contact: poor.   Motor: No abnormal movements, no psychomotor slowing or activation.  Speech: Regular rate. soft.   Mood: "ok"   Affect: flat   Thought Process: concrete. at times illogical.    Thought Content: denies SI and HI   Perceptual: endorses AH, denies command type. denies VH. + paranoia   Insight: partial   Judgment: fair   Impulse control: appropriate thus far     Attention: fair  Gait: Intact. 
Appearance: Dressed appropriately in casual clothing. good hygiene + grooming .  Attitude / Behavior: Cooperative. cooperative. polite   Relatedness: fair   Eye contact: fair  Motor: No abnormal movements, no psychomotor slowing or activation.  Speech: Regular rate. spontaneous   Mood: "okay"    Affect: neutral. stable.   Thought Process: organized, goal-directed, mostly logical.   Thought Content: denies SI and HI   Perceptual: denies VH, +AH, noncommand type   Insight: fair-to-good   Judgment: improving   Impulse control: appropriate thus far     Attention: appropriate  Gait: Intact. 
Appearance: Dressed appropriately in casual clothing. good hygiene + grooming .  Attitude / Behavior: Cooperative. cooperative. polite   Relatedness: fair   Eye contact: fair  Motor: No abnormal movements, no psychomotor slowing or activation.  Speech: Regular rate. spontaneous   Mood: "fine"    Affect: neutral. stable.   Thought Process: organized, goal-directed, mostly logical.   Thought Content: denies SI and HI   Perceptual: denies VH, +AH, noncommand type   Insight: fair-to-good   Judgment: improving   Impulse control: appropriate thus far     Attention: appropriate  Gait: Intact. 
Appearance: Dressed appropriately in casual clothing. fair hygiene + grooming .  Attitude / Behavior: Cooperative. cooperative. pleasant.   Relatedness: fair   Eye contact: poor.   Motor: No abnormal movements, no psychomotor slowing or activation.  Speech: Regular rate. soft.   Mood: "good"   Affect: flat  Thought Process: concrete. mostly logical   Thought Content: denies SI and HI   Perceptual: denies VH, +AH, noncommand type   Insight: partial   Judgment: improving   Impulse control: appropriate thus far     Attention: fair  Gait: Intact. 
Appearance: Dressed appropriately in casual clothing. fair hygiene + grooming .  Attitude / Behavior: Cooperative. cooperative. pleasant.   Relatedness: fair   Eye contact: poor.   Motor: No abnormal movements, no psychomotor slowing or activation.  Speech: Regular rate. soft.   Mood: "good"   Affect: constricted  Thought Process: concrete. at times illogical.    Thought Content: denies SI and HI   Perceptual: denies VH, +AH, noncommand  Insight: partial   Judgment: improving   Impulse control: appropriate thus far     Attention: fair  Gait: Intact. 
Appearance: Dressed appropriately in casual clothing. good hygiene + grooming .  Attitude / Behavior: Cooperative. cooperative. pleasant.   Relatedness: fair   Eye contact: fair  Motor: No abnormal movements, no psychomotor slowing or activation.  Speech: Regular rate. spontaneous   Mood: "Fine"   Affect: neutral. stable.   Thought Process: organized, goal-directed, mostly logical.   Thought Content: denies SI and HI   Perceptual: denies VH, +AH, noncommand type   Insight: fair-to-good   Judgment: improving   Impulse control: appropriate thus far     Attention: appropriate  Gait: Intact. 
Appearance: Dressed appropriately in casual clothing. fair hygiene + grooming .  Attitude / Behavior: Cooperative. cooperative. pleasant.   Relatedness: fair   Eye contact: poor.   Motor: No abnormal movements, no psychomotor slowing or activation.  Speech: Regular rate. soft.   Mood: "alright"   Affect: constricted  Thought Process: concrete.   Thought Content: denies SI and HI   Perceptual: denies VH, +AH, noncommand  Insight: partial   Judgment: improving   Impulse control: appropriate thus far     Attention: fair  Gait: Intact.

## 2023-02-02 NOTE — BH INPATIENT PSYCHIATRY PROGRESS NOTE - NSBHATTESTTYPEVISIT_PSY_A_CORE
MABEL without on-site Attending supervision
Attending Only
MABEL without on-site Attending supervision
Attending Only

## 2023-02-02 NOTE — BH INPATIENT PSYCHIATRY PROGRESS NOTE - NSICDXBHSECONDARYDX_PSY_ALL_CORE
COVID   U07.1  

## 2023-03-15 NOTE — BH INPATIENT PSYCHIATRY DISCHARGE NOTE - NSTOBACCOUSAGEY/N_GEN_A_CS
This prescription has been approved and sent to   [id number] - [place], [place] - [address]  [address] [place] [address]  Phone: [phone] Fax: [phone]     No

## 2023-03-23 ENCOUNTER — INPATIENT (INPATIENT)
Facility: HOSPITAL | Age: 28
LOS: 22 days | Discharge: HOME | DRG: 750 | End: 2023-04-15
Attending: PSYCHIATRY & NEUROLOGY | Admitting: PSYCHIATRY & NEUROLOGY
Payer: MEDICAID

## 2023-03-23 VITALS
SYSTOLIC BLOOD PRESSURE: 169 MMHG | TEMPERATURE: 98 F | RESPIRATION RATE: 20 BRPM | DIASTOLIC BLOOD PRESSURE: 112 MMHG | HEART RATE: 121 BPM | OXYGEN SATURATION: 100 % | HEIGHT: 67 IN

## 2023-03-23 DIAGNOSIS — F23 BRIEF PSYCHOTIC DISORDER: ICD-10-CM

## 2023-03-23 DIAGNOSIS — F20.1 DISORGANIZED SCHIZOPHRENIA: ICD-10-CM

## 2023-03-23 LAB
ALBUMIN SERPL ELPH-MCNC: 3.9 G/DL — SIGNIFICANT CHANGE UP (ref 3.3–5)
ALP SERPL-CCNC: 72 U/L — SIGNIFICANT CHANGE UP (ref 40–120)
ALT FLD-CCNC: 45 U/L — SIGNIFICANT CHANGE UP (ref 12–78)
AMPHET UR-MCNC: NEGATIVE — SIGNIFICANT CHANGE UP
ANION GAP SERPL CALC-SCNC: 8 MMOL/L — SIGNIFICANT CHANGE UP (ref 5–17)
APAP SERPL-MCNC: < 2 UG/ML (ref 10–30)
APPEARANCE UR: CLEAR — SIGNIFICANT CHANGE UP
AST SERPL-CCNC: 31 U/L — SIGNIFICANT CHANGE UP (ref 15–37)
BARBITURATES UR SCN-MCNC: NEGATIVE — SIGNIFICANT CHANGE UP
BASOPHILS # BLD AUTO: 0.04 K/UL — SIGNIFICANT CHANGE UP (ref 0–0.2)
BASOPHILS NFR BLD AUTO: 0.5 % — SIGNIFICANT CHANGE UP (ref 0–2)
BENZODIAZ UR-MCNC: NEGATIVE — SIGNIFICANT CHANGE UP
BILIRUB SERPL-MCNC: 0.8 MG/DL — SIGNIFICANT CHANGE UP (ref 0.2–1.2)
BILIRUB UR-MCNC: NEGATIVE — SIGNIFICANT CHANGE UP
BUN SERPL-MCNC: 10 MG/DL — SIGNIFICANT CHANGE UP (ref 7–23)
CALCIUM SERPL-MCNC: 9.3 MG/DL — SIGNIFICANT CHANGE UP (ref 8.5–10.1)
CHLORIDE SERPL-SCNC: 108 MMOL/L — SIGNIFICANT CHANGE UP (ref 96–108)
CO2 SERPL-SCNC: 24 MMOL/L — SIGNIFICANT CHANGE UP (ref 22–31)
COCAINE METAB.OTHER UR-MCNC: NEGATIVE — SIGNIFICANT CHANGE UP
COLOR SPEC: YELLOW — SIGNIFICANT CHANGE UP
CREAT SERPL-MCNC: 1.16 MG/DL — SIGNIFICANT CHANGE UP (ref 0.5–1.3)
DIFF PNL FLD: NEGATIVE — SIGNIFICANT CHANGE UP
EGFR: 88 ML/MIN/1.73M2 — SIGNIFICANT CHANGE UP
EOSINOPHIL # BLD AUTO: 0.15 K/UL — SIGNIFICANT CHANGE UP (ref 0–0.5)
EOSINOPHIL NFR BLD AUTO: 1.7 % — SIGNIFICANT CHANGE UP (ref 0–6)
ETHANOL SERPL-MCNC: <10 MG/DL — SIGNIFICANT CHANGE UP (ref 0–10)
FLUAV AG NPH QL: SIGNIFICANT CHANGE UP
FLUBV AG NPH QL: SIGNIFICANT CHANGE UP
GLUCOSE SERPL-MCNC: 125 MG/DL — HIGH (ref 70–99)
GLUCOSE UR QL: NEGATIVE — SIGNIFICANT CHANGE UP
HCT VFR BLD CALC: 44 % — SIGNIFICANT CHANGE UP (ref 39–50)
HGB BLD-MCNC: 14.4 G/DL — SIGNIFICANT CHANGE UP (ref 13–17)
IMM GRANULOCYTES NFR BLD AUTO: 0.3 % — SIGNIFICANT CHANGE UP (ref 0–0.9)
KETONES UR-MCNC: NEGATIVE — SIGNIFICANT CHANGE UP
LEUKOCYTE ESTERASE UR-ACNC: NEGATIVE — SIGNIFICANT CHANGE UP
LYMPHOCYTES # BLD AUTO: 3.08 K/UL — SIGNIFICANT CHANGE UP (ref 1–3.3)
LYMPHOCYTES # BLD AUTO: 35.1 % — SIGNIFICANT CHANGE UP (ref 13–44)
MCHC RBC-ENTMCNC: 30.9 PG — SIGNIFICANT CHANGE UP (ref 27–34)
MCHC RBC-ENTMCNC: 32.7 GM/DL — SIGNIFICANT CHANGE UP (ref 32–36)
MCV RBC AUTO: 94.4 FL — SIGNIFICANT CHANGE UP (ref 80–100)
METHADONE UR-MCNC: NEGATIVE — SIGNIFICANT CHANGE UP
MONOCYTES # BLD AUTO: 0.82 K/UL — SIGNIFICANT CHANGE UP (ref 0–0.9)
MONOCYTES NFR BLD AUTO: 9.4 % — SIGNIFICANT CHANGE UP (ref 2–14)
NEUTROPHILS # BLD AUTO: 4.65 K/UL — SIGNIFICANT CHANGE UP (ref 1.8–7.4)
NEUTROPHILS NFR BLD AUTO: 53 % — SIGNIFICANT CHANGE UP (ref 43–77)
NITRITE UR-MCNC: NEGATIVE — SIGNIFICANT CHANGE UP
OPIATES UR-MCNC: NEGATIVE — SIGNIFICANT CHANGE UP
PCP SPEC-MCNC: SIGNIFICANT CHANGE UP
PCP UR-MCNC: NEGATIVE — SIGNIFICANT CHANGE UP
PH UR: 8 — SIGNIFICANT CHANGE UP (ref 5–8)
PLATELET # BLD AUTO: 259 K/UL — SIGNIFICANT CHANGE UP (ref 150–400)
POTASSIUM SERPL-MCNC: 3.8 MMOL/L — SIGNIFICANT CHANGE UP (ref 3.5–5.3)
POTASSIUM SERPL-SCNC: 3.8 MMOL/L — SIGNIFICANT CHANGE UP (ref 3.5–5.3)
PROT SERPL-MCNC: 7.9 GM/DL — SIGNIFICANT CHANGE UP (ref 6–8.3)
PROT UR-MCNC: NEGATIVE — SIGNIFICANT CHANGE UP
RBC # BLD: 4.66 M/UL — SIGNIFICANT CHANGE UP (ref 4.2–5.8)
RBC # FLD: 11.3 % — SIGNIFICANT CHANGE UP (ref 10.3–14.5)
RSV RNA NPH QL NAA+NON-PROBE: SIGNIFICANT CHANGE UP
SALICYLATES SERPL-MCNC: <1.7 MG/DL — LOW (ref 2.8–20)
SARS-COV-2 RNA SPEC QL NAA+PROBE: SIGNIFICANT CHANGE UP
SODIUM SERPL-SCNC: 140 MMOL/L — SIGNIFICANT CHANGE UP (ref 135–145)
SP GR SPEC: 1.01 — SIGNIFICANT CHANGE UP (ref 1.01–1.02)
THC UR QL: NEGATIVE — SIGNIFICANT CHANGE UP
UROBILINOGEN FLD QL: NEGATIVE — SIGNIFICANT CHANGE UP
WBC # BLD: 8.77 K/UL — SIGNIFICANT CHANGE UP (ref 3.8–10.5)
WBC # FLD AUTO: 8.77 K/UL — SIGNIFICANT CHANGE UP (ref 3.8–10.5)

## 2023-03-23 PROCEDURE — 81003 URINALYSIS AUTO W/O SCOPE: CPT

## 2023-03-23 PROCEDURE — 36415 COLL VENOUS BLD VENIPUNCTURE: CPT

## 2023-03-23 PROCEDURE — 99053 MED SERV 10PM-8AM 24 HR FAC: CPT

## 2023-03-23 PROCEDURE — 99285 EMERGENCY DEPT VISIT HI MDM: CPT

## 2023-03-23 PROCEDURE — 93010 ELECTROCARDIOGRAM REPORT: CPT

## 2023-03-23 PROCEDURE — 97162 PT EVAL MOD COMPLEX 30 MIN: CPT | Mod: GP

## 2023-03-23 PROCEDURE — 80307 DRUG TEST PRSMV CHEM ANLYZR: CPT

## 2023-03-23 PROCEDURE — 99233 SBSQ HOSP IP/OBS HIGH 50: CPT

## 2023-03-23 PROCEDURE — 80164 ASSAY DIPROPYLACETIC ACD TOT: CPT

## 2023-03-23 RX ORDER — DIPHENHYDRAMINE HCL 50 MG
50 CAPSULE ORAL ONCE
Refills: 0 | Status: DISCONTINUED | OUTPATIENT
Start: 2023-03-23 | End: 2023-04-14

## 2023-03-23 RX ORDER — MAGNESIUM HYDROXIDE 400 MG/1
30 TABLET, CHEWABLE ORAL DAILY
Refills: 0 | Status: DISCONTINUED | OUTPATIENT
Start: 2023-03-23 | End: 2023-04-14

## 2023-03-23 RX ORDER — HALOPERIDOL DECANOATE 100 MG/ML
5 INJECTION INTRAMUSCULAR ONCE
Refills: 0 | Status: DISCONTINUED | OUTPATIENT
Start: 2023-03-23 | End: 2023-04-14

## 2023-03-23 RX ORDER — PALIPERIDONE 1.5 MG/1
3 TABLET, EXTENDED RELEASE ORAL DAILY
Refills: 0 | Status: DISCONTINUED | OUTPATIENT
Start: 2023-03-23 | End: 2023-03-24

## 2023-03-23 RX ORDER — TRAZODONE HCL 50 MG
50 TABLET ORAL AT BEDTIME
Refills: 0 | Status: DISCONTINUED | OUTPATIENT
Start: 2023-03-23 | End: 2023-04-15

## 2023-03-23 RX ORDER — HALOPERIDOL DECANOATE 100 MG/ML
5 INJECTION INTRAMUSCULAR ONCE
Refills: 0 | Status: COMPLETED | OUTPATIENT
Start: 2023-03-23 | End: 2023-03-23

## 2023-03-23 RX ORDER — DIPHENHYDRAMINE HCL 50 MG
50 CAPSULE ORAL EVERY 6 HOURS
Refills: 0 | Status: DISCONTINUED | OUTPATIENT
Start: 2023-03-23 | End: 2023-04-14

## 2023-03-23 RX ORDER — ACETAMINOPHEN 500 MG
650 TABLET ORAL ONCE
Refills: 0 | Status: DISCONTINUED | OUTPATIENT
Start: 2023-03-23 | End: 2023-04-14

## 2023-03-23 RX ORDER — HALOPERIDOL DECANOATE 100 MG/ML
5 INJECTION INTRAMUSCULAR EVERY 6 HOURS
Refills: 0 | Status: DISCONTINUED | OUTPATIENT
Start: 2023-03-23 | End: 2023-04-14

## 2023-03-23 RX ADMIN — Medication 50 MILLIGRAM(S): at 20:50

## 2023-03-23 NOTE — ED PROVIDER NOTE - CLINICAL SUMMARY MEDICAL DECISION MAKING FREE TEXT BOX
27 year old male with extensive psychiatric history. In ED pt is acutely psychotic. Plan: medical screening and psych consult. 27 year old male with extensive psychiatric history. In ED pt is acutely psychotic. Plan: medical screening and psych consult.    10:49am- admit to Dr. Navarrete

## 2023-03-23 NOTE — BH INPATIENT PSYCHIATRY ASSESSMENT NOTE - DETAILS
Telephone Encounter by Katya Caldera CMA at 12/07/16 12:59 PM     Author:  Katya Caldera CMA Service:  (none) Author Type:  Certified Medical Assistant     Filed:  12/07/16 12:59 PM Encounter Date:  12/6/2016 Status:  Signed     :  Katya Caldera CMA (Certified Medical Assistant)            Fax confirmation received.[JM1.1M]         Revision History        User Key Date/Time User Provider Type Action    > JM1.1 12/07/16 12:59 PM Katya Caldera CMA Certified Medical Assistant Sign    M - Manual             no hx

## 2023-03-23 NOTE — BH INPATIENT PSYCHIATRY ASSESSMENT NOTE - NSBHMETABOLIC_PSY_ALL_CORE_FT
BMI: BMI (kg/m2): 27.9 (03-23-23 @ 16:50)  HbA1c:   Glucose:   BP: 137/80 (03-23-23 @ 14:30) (133/83 - 169/112)  Lipid Panel:  BMI: BMI (kg/m2): 27.9 (03-23-23 @ 16:50)  HbA1c:   Glucose:   BP: --  Lipid Panel:

## 2023-03-23 NOTE — BH INPATIENT PSYCHIATRY ASSESSMENT NOTE - NSBHCRANIAL_PSY_ALL_CORE
Smiles, shows teeth, opens mouth, sticks out tongue (V, VII, XI)/Normal speech (IX, X, XII)/Hearing intact (VIII)

## 2023-03-23 NOTE — ED ADULT NURSE NOTE - CHIEF COMPLAINT QUOTE
pt presents to ED with [facility] [id number]. [facility] called by mom after pt stated "I am afraid I am going to hurt others." pt denies SI or HI in ED. cooperative in triage. hx schizophrenia. 1:1 initiated.

## 2023-03-23 NOTE — BH INPATIENT PSYCHIATRY ASSESSMENT NOTE - NSBHCHARTREVIEWVS_PSY_A_CORE FT
Vital Signs Last 24 Hrs  T(C): 36.8 (03-23-23 @ 16:50), Max: 36.9 (03-23-23 @ 10:33)  T(F): 98.2 (03-23-23 @ 16:50), Max: 98.4 (03-23-23 @ 10:33)  HR: 95 (03-23-23 @ 14:30) (95 - 121)  BP: 137/80 (03-23-23 @ 14:30) (133/83 - 169/112)  BP(mean): 96 (03-23-23 @ 10:33) (96 - 96)  RR: 18 (03-23-23 @ 14:30) (18 - 20)  SpO2: 99% (03-23-23 @ 14:30) (99% - 100%)    Orthostatic VS  03-23-23 @ 16:50  Lying BP: --/-- HR: --  Sitting BP: 123/81 HR: 88  Standing BP: 123/73 HR: 111  Site: --  Mode: --   Vital Signs Last 24 Hrs  T(C): 37.1 (03-24-23 @ 07:12), Max: 37.1 (03-24-23 @ 07:12)  T(F): 98.7 (03-24-23 @ 07:12), Max: 98.7 (03-24-23 @ 07:12)  HR: --  BP: --  BP(mean): --  RR: 18 (03-24-23 @ 07:12) (18 - 18)  SpO2: 99% (03-24-23 @ 07:12) (99% - 99%)    Orthostatic VS  03-24-23 @ 07:12  Lying BP: --/-- HR: --  Sitting BP: 152/84 HR: 90  Standing BP: 154/73 HR: 100  Site: upper right arm  Mode: electronic  Orthostatic VS  03-23-23 @ 16:50  Lying BP: --/-- HR: --  Sitting BP: 123/81 HR: 88  Standing BP: 123/73 HR: 111  Site: --  Mode: --   Vital Signs Last 24 Hrs  T(C): 36.7 (04-04-23 @ 07:31), Max: 36.7 (04-04-23 @ 07:31)  T(F): 98 (04-04-23 @ 07:31), Max: 98 (04-04-23 @ 07:31)  HR: --  BP: --  BP(mean): --  RR: 16 (04-04-23 @ 07:31) (16 - 16)  SpO2: 100% (04-04-23 @ 07:31) (100% - 100%)    Orthostatic VS  04-04-23 @ 07:31  Lying BP: --/-- HR: --  Sitting BP: 145/86 HR: 94  Standing BP: 143/89 HR: 100  Site: upper right arm  Mode: electronic  Orthostatic VS  04-03-23 @ 07:41  Lying BP: --/-- HR: --  Sitting BP: 141/78 HR: 97  Standing BP: 146/76 HR: 100  Site: upper right arm  Mode: electronic

## 2023-03-23 NOTE — ED PROVIDER NOTE - OBJECTIVE STATEMENT
27 year old male with PMHx of schizophrenia and extensive cannabis use presents to the ED BIB [facility] complaining of homicidal ideation. [facility] relates they were called by mother after pt stated "I am afraid I am going to hurt others." Pt has a history of multiple prior psych hospitalizations, last discharged in early February. Pt denies any physical complaints.

## 2023-03-23 NOTE — BH PATIENT PROFILE - SUICIDALITY
Quality 226: Preventive Care And Screening: Tobacco Use: Screening And Cessation Intervention: Patient screened for tobacco use and is an ex/non-smoker Quality 431: Preventive Care And Screening: Unhealthy Alcohol Use - Screening: Patient screened for unhealthy alcohol use using a single question and scores less than 2 times per year Detail Level: Generalized Quality 130: Documentation Of Current Medications In The Medical Record: Current Medications Documented No

## 2023-03-23 NOTE — ED ADULT NURSE NOTE - OTHER
pt denies auditory hallucinations, but at times pt is having conversations with someone that is not there.

## 2023-03-23 NOTE — ED BEHAVIORAL HEALTH ASSESSMENT NOTE - SUMMARY
Pt is a 28 yo male, with h/o schizophrenia, domiciled with father, unemployed, multiple prior hospitalizations, no known SA, presents psychotic, disorganised, inappropriately laughing, sticking his tongue out, sending kisses , stating "I love  you", responding to  internal stimuli. Pt is disorganized with impaired  comprehension. Can not provide info about his hx. Most info obtained from old records in [facility].  Pt needs inpatient psych admission:  1. Admit to 5N,  2. restart Invega,   3. No need for co but need reassessment

## 2023-03-23 NOTE — BH PATIENT PROFILE - HOME MEDICATIONS
Padmini Sustenna 156 mg/mL intramuscular suspension, extended release , 156 milligram(s) intramuscularly every 28 days. NEXT DUE 3/2/23   traZODone 50 mg oral tablet , 1 tab(s) orally once a day (at bedtime), As needed, insomnia

## 2023-03-23 NOTE — ED ADULT NURSE NOTE - OBJECTIVE STATEMENT
pt presents to ED with [facility]D [id number]. [facility] called by mom after pt stated "I am afraid I am going to hurt others." pt denies SI or HI in ED. cooperative in triage. hx schizophrenia. 1:1 initiated. Pt on stretcher in hallway, pt is mumbling, talking to himself, pt states " I don't not want to have my  blood drawn because Sunil is telling me that I can't". Pt is standing up abruptly and walks away not listening to come back to his bed. Code gray initiated, pt moved to [facility]. Able to verbally de-escalate, pt agreed and blood drawn as per MD orders. Pt is not answering questions congruently, at times acting with bizarre behaviors. Pt is calm and cooperative at this time.

## 2023-03-23 NOTE — ED PROVIDER NOTE - PROGRESS NOTE DETAILS
Nimesh Riggs: Pt to be admitted under Dr. Navarrete. Keely DO: patient medically clear pending UA/UDS
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
## 2023-03-23 NOTE — ED ADULT TRIAGE NOTE - CHIEF COMPLAINT QUOTE
pt presents to ED with [facility]D [id number]. [facility] called by mom after pt stated "I am afraid I am going to hurt others." pt denies SI or HI in ED. cooperative in triage. hx schizophrenia. 1:1 initiated.
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
## 2023-03-23 NOTE — BH INPATIENT PSYCHIATRY ASSESSMENT NOTE - CURRENT MEDICATION
MEDICATIONS  (STANDING):  paliperidone ER. 3 milliGRAM(s) Oral daily  traZODone 50 milliGRAM(s) Oral at bedtime    MEDICATIONS  (PRN):  acetaminophen     Tablet .. 650 milliGRAM(s) Oral once PRN Temp greater or equal to 38C (100.4F), Mild Pain (1 - 3), Moderate Pain (4 - 6)  aluminum hydroxide/magnesium hydroxide/simethicone Suspension 30 milliLiter(s) Oral every 6 hours PRN Dyspepsia  diphenhydrAMINE 50 milliGRAM(s) Oral every 6 hours PRN Extrapyramidal prophylaxis  diphenhydrAMINE Elixir 50 milliGRAM(s) Oral once PRN Extrapyramidal prophylaxis  haloperidol     Tablet 5 milliGRAM(s) Oral every 6 hours PRN moderate psychotic agitation  haloperidol    Injectable 5 milliGRAM(s) IntraMuscular once PRN severe psychotic agitation  LORazepam     Tablet 2 milliGRAM(s) Oral every 6 hours PRN moderate psychotic agitation  LORazepam   Injectable 2 milliGRAM(s) IntraMuscular once PRN severe psychotic agitation  magnesium hydroxide Suspension 30 milliLiter(s) Oral daily PRN Constipation   MEDICATIONS  (STANDING):  haloperidol     Tablet 5 milliGRAM(s) Oral daily  haloperidol     Tablet 10 milliGRAM(s) Oral at bedtime  traZODone 50 milliGRAM(s) Oral at bedtime    MEDICATIONS  (PRN):  acetaminophen     Tablet .. 650 milliGRAM(s) Oral once PRN Temp greater or equal to 38C (100.4F), Mild Pain (1 - 3), Moderate Pain (4 - 6)  aluminum hydroxide/magnesium hydroxide/simethicone Suspension 30 milliLiter(s) Oral every 6 hours PRN Dyspepsia  diphenhydrAMINE 50 milliGRAM(s) Oral every 6 hours PRN Extrapyramidal prophylaxis  diphenhydrAMINE Elixir 50 milliGRAM(s) Oral once PRN Extrapyramidal prophylaxis  haloperidol     Tablet 5 milliGRAM(s) Oral every 6 hours PRN moderate psychotic agitation  haloperidol    Injectable 5 milliGRAM(s) IntraMuscular once PRN severe psychotic agitation  LORazepam     Tablet 2 milliGRAM(s) Oral every 6 hours PRN moderate psychotic agitation  LORazepam   Injectable 2 milliGRAM(s) IntraMuscular once PRN severe psychotic agitation  magnesium hydroxide Suspension 30 milliLiter(s) Oral daily PRN Constipation   MEDICATIONS  (STANDING):  diphenhydrAMINE Injectable 50 milliGRAM(s) IntraMuscular once  divalproex  milliGRAM(s) Oral at bedtime  haloperidol     Tablet 10 milliGRAM(s) Oral at bedtime  haloperidol    Injectable 7.5 milliGRAM(s) IntraMuscular once  traZODone 50 milliGRAM(s) Oral at bedtime    MEDICATIONS  (PRN):  acetaminophen     Tablet .. 650 milliGRAM(s) Oral once PRN Temp greater or equal to 38C (100.4F), Mild Pain (1 - 3), Moderate Pain (4 - 6)  aluminum hydroxide/magnesium hydroxide/simethicone Suspension 30 milliLiter(s) Oral every 6 hours PRN Dyspepsia  diphenhydrAMINE 50 milliGRAM(s) Oral every 6 hours PRN Extrapyramidal prophylaxis  diphenhydrAMINE Elixir 50 milliGRAM(s) Oral once PRN Extrapyramidal prophylaxis  haloperidol     Tablet 5 milliGRAM(s) Oral every 6 hours PRN moderate psychotic agitation  haloperidol    Injectable 5 milliGRAM(s) IntraMuscular once PRN severe psychotic agitation  magnesium hydroxide Suspension 30 milliLiter(s) Oral daily PRN Constipation

## 2023-03-23 NOTE — ED ADULT TRIAGE NOTE - NS_BHTRGCALCULATEDSCORE_ED_A_ED_FT
MIKAEL MCDERMOTT  [facility] [place] [facility]      Patient is a 52y old  Male who presents with a chief complaint of Back pain (11 Aug 2022 09:31)        Interval events:  Patient seen and examined at bedside. No acute events overnight. Says he's very tired. His back pain is controlled. Denies bowel/bladder incontinence. Denies numbness/weakness.     -PMHx: IV drug abuse    Vertebral osteomyelitis    MSSA bacteremia      -PSHx:No significant past surgical history            REVIEW OF SYSTEMS:  CONSTITUTIONAL: +fatigue  RESPIRATORY: No cough, wheezing, chills or hemoptysis; No shortness of breath  CARDIOVASCULAR: No chest pain, palpitations, dizziness, or leg swelling  GASTROINTESTINAL: No abdominal or epigastric pain. No nausea, vomiting, or hematemesis; No diarrhea or constipation. No melena or hematochezia.  NEUROLOGICAL: No headaches  LYMPH NODES: No enlarged glands  MUSCULOSKELETAL: No joint pain or swelling; No muscle, back, or extremity pain      T(C): , Max: 37.4 (08-10-22 @ 21:43)  HR: 74 (08-11-22 @ 12:35)  BP: 103/56 (08-11-22 @ 12:35)  RR: 18 (08-11-22 @ 12:35)  SpO2: 95% (08-11-22 @ 12:35)  CAPILLARY BLOOD GLUCOSE          PHYSICAL EXAM:  GEN: well-groomed; no distress  HEENT: PERRL; EOMI; conjunctiva clear; OD with swelling/erythema around orbit, not painful to move eye   CHEST: clear to auscultation bilaterally; no wheezes; no rales; no rhonchi  CARDS: 4/6 systolic murmur  ABD: soft; nontender; nondistended; normal active bowel sounds, no hernia  NEURO: cranial nerves II-XII intact; sensation intact; superficial reflexes intact, mild motor weakness LEs  LYMPH: No lymphadedenopathy  MSK: no joint swelling; no joint erythema; no joint warmth; no calf tenderness; no chest wall tenderness        LABS:          10.6  14.61  )-------(240          32.1  N=--  L=--  MCV=88.4          10.8  15.24  )-------(229          32.7  N=84.5  L=6.2  MCV=88.1    136|97<L>|15  ------------------<173<H>  4.3|29|0.7  eGFR:--  Ca:8.9  137|96<L>|10  ------------------<149<H>  4.1|31|0.9  eGFR:--  Ca:9.1            Microbiology:      RADIOLOGY & ADDITIONAL TESTS:  < from: Xray Chest 1 View-PORTABLE IMMEDIATE (Xray Chest 1 View-PORTABLE IMMEDIATE .) (08.10.22 @ 16:22) >    Impression:    Left lung base atelectasis.    < end of copied text >    < from: CT Orbit w/ IV Cont (08.10.22 @ 16:05) >  IMPRESSION:    Right periorbital (pre-septal) soft tissue swelling consistent with   cellulitis in the appropriate clinical scenario. No evidence of abscess.   No evidence of post-septal inflammation.    < end of copied text >        Medications:  acetaminophen     Tablet .. 650 milliGRAM(s) Oral every 6 hours PRN  ALPRAZolam 2 milliGRAM(s) Oral three times a day  aluminum hydroxide/magnesium hydroxide/simethicone Suspension 30 milliLiter(s) Oral every 4 hours PRN  buPROPion XL (24-Hour) . 300 milliGRAM(s) Oral daily  ceFAZolin   IVPB 2000 milliGRAM(s) IV Intermittent every 8 hours  enoxaparin Injectable 40 milliGRAM(s) SubCutaneous every 24 hours  folic acid 1 milliGRAM(s) Oral daily  gabapentin 300 milliGRAM(s) Oral three times a day  melatonin 3 milliGRAM(s) Oral at bedtime PRN  methadone    Tablet 135 milliGRAM(s) Oral daily  mirtazapine 30 milliGRAM(s) Oral at bedtime  multivitamin 1 Tablet(s) Oral daily  ondansetron Injectable 4 milliGRAM(s) IV Push every 8 hours PRN  QUEtiapine 200 milliGRAM(s) Oral three times a day       2

## 2023-03-23 NOTE — ED ADULT NURSE NOTE - NSFALLRSKOUTCOME_ED_ALL_ED
Price (Do Not Change): 0.00
Detail Level: Simple
Instructions: This plan will send the code FBSE to the PM system.  DO NOT or CHANGE the price.
Fall Risk

## 2023-03-23 NOTE — ED BEHAVIORAL HEALTH ASSESSMENT NOTE - HPI (INCLUDE ILLNESS QUALITY, SEVERITY, DURATION, TIMING, CONTEXT, MODIFYING FACTORS, ASSOCIATED SIGNS AND SYMPTOMS)
Pt is a 26 yo male, with h/o schizophrenia, domiciled with father, unemployed, multiple prior hospitalizations, no known SA, presents psychotic, disorganised, inappropriately laughing, sticking his tongue out, sending kisses , stating "I love  you", responding to  internal stimuli. Pt is disorganized with impaired  comprehension. Can not provide info about his hx. Most info obtained from old records in [facility].  see more from last admission in [facility] In 2023:  "denies medical hx, hx of extensive cannabis use, presented initially to [facility] after being found faced down in the woods, found to be psychotic, transferred to [facility]. Pt now transferred to [facility] due to testing + for COVID. At [facility], patient was described as cooperative, disorganized, making bizarre statements including “demons are telling [him] to do things.” Patient also noted an increase in his alcohol consumption. He endorsed AH that were intermittently command type to hurt others. Per reports received, patient’s father was hospitalized due to seizure and patient thought his father . Patient was started on Risperdal, titrated to 4mg qhs and pt agreed to trial of Invega Sustenna. Per handoff, prior auth obtained by [facility] for Invega Sustenna. On initial presentation to [facility], patient reports being found outside and being brought to [facility]. He is disorganized in his responses, states that he was very worried that his father had , but was recently reassured that he is actually alive. He reports a dx of Schizophrenia, endorses cannabis use, reports having AH mostly “of random things,” but chart report suggests past CAH to hurt others. Patient adherent to Risperdal, was receiving 4mg qhs at [facility]. Patient also endorsing feeling nervous and some paranoia. Pt denies manic sx. Denies depressive sx. Denies si and hi. Reports casual alcohol use. denies withdrawals, past detox or rehabs.  	     Past History:  Past Psychiatric History (Include Details Regarding Onset, Course of Illness, Inpatient/Outpatient Treatment)	1 prior hospitalization in 2019   dx of schizophrenia   denies prior suicide attempt"    NOocolatoria available

## 2023-03-23 NOTE — ED BEHAVIORAL HEALTH ASSESSMENT NOTE - RISK ASSESSMENT
High acute risk: psychotic, disorganized   Increased long term risk: Risk factors for self-harm / harm to others: psychiatric diagnosis, substance use  Protective factors: inpatient and in a controlled setting with limited access to lethal means. not endorsing harm to self or others, able to contract for safety.   Mitigation: inpatient  admission, safety plan

## 2023-03-24 PROCEDURE — 99223 1ST HOSP IP/OBS HIGH 75: CPT

## 2023-03-24 RX ORDER — HALOPERIDOL DECANOATE 100 MG/ML
7.5 INJECTION INTRAMUSCULAR ONCE
Refills: 0 | Status: DISCONTINUED | OUTPATIENT
Start: 2023-03-24 | End: 2023-04-14

## 2023-03-24 RX ORDER — HALOPERIDOL DECANOATE 100 MG/ML
5 INJECTION INTRAMUSCULAR DAILY
Refills: 0 | Status: DISCONTINUED | OUTPATIENT
Start: 2023-03-24 | End: 2023-03-28

## 2023-03-24 RX ORDER — DIPHENHYDRAMINE HCL 50 MG
50 CAPSULE ORAL ONCE
Refills: 0 | Status: DISCONTINUED | OUTPATIENT
Start: 2023-03-24 | End: 2023-04-14

## 2023-03-24 RX ORDER — HALOPERIDOL DECANOATE 100 MG/ML
10 INJECTION INTRAMUSCULAR AT BEDTIME
Refills: 0 | Status: DISCONTINUED | OUTPATIENT
Start: 2023-03-24 | End: 2023-04-15

## 2023-03-24 RX ADMIN — HALOPERIDOL DECANOATE 5 MILLIGRAM(S): 100 INJECTION INTRAMUSCULAR at 08:40

## 2023-03-24 RX ADMIN — PALIPERIDONE 3 MILLIGRAM(S): 1.5 TABLET, EXTENDED RELEASE ORAL at 08:38

## 2023-03-24 RX ADMIN — Medication 50 MILLIGRAM(S): at 08:38

## 2023-03-24 RX ADMIN — Medication 50 MILLIGRAM(S): at 20:16

## 2023-03-24 RX ADMIN — HALOPERIDOL DECANOATE 10 MILLIGRAM(S): 100 INJECTION INTRAMUSCULAR at 20:16

## 2023-03-24 RX ADMIN — Medication 2 MILLIGRAM(S): at 08:38

## 2023-03-24 NOTE — BH TREATMENT PLAN - NSTXDISORGINTERRN_PSY_ALL_CORE
Establish trust/therapeutic rapport to encourage ventilation of feelings.  Provide emotional support.  Reality orient as needed.  Encourage participation in unit activities when appropriate, with emphasis on coping/stress management.

## 2023-03-24 NOTE — BH SAFETY PLAN - CLINICIAN PAGER OR EMERGENCY CONTACT # 1
[employer] (Monet Michelle)                                 [phone]

## 2023-03-25 PROCEDURE — 99232 SBSQ HOSP IP/OBS MODERATE 35: CPT

## 2023-03-25 RX ADMIN — Medication 50 MILLIGRAM(S): at 20:30

## 2023-03-25 RX ADMIN — HALOPERIDOL DECANOATE 5 MILLIGRAM(S): 100 INJECTION INTRAMUSCULAR at 13:34

## 2023-03-25 RX ADMIN — Medication 2 MILLIGRAM(S): at 20:31

## 2023-03-25 RX ADMIN — HALOPERIDOL DECANOATE 10 MILLIGRAM(S): 100 INJECTION INTRAMUSCULAR at 20:30

## 2023-03-26 PROCEDURE — 99232 SBSQ HOSP IP/OBS MODERATE 35: CPT

## 2023-03-26 RX ADMIN — HALOPERIDOL DECANOATE 10 MILLIGRAM(S): 100 INJECTION INTRAMUSCULAR at 20:44

## 2023-03-26 RX ADMIN — Medication 2 MILLIGRAM(S): at 20:43

## 2023-03-26 RX ADMIN — Medication 50 MILLIGRAM(S): at 20:43

## 2023-03-26 RX ADMIN — HALOPERIDOL DECANOATE 5 MILLIGRAM(S): 100 INJECTION INTRAMUSCULAR at 09:36

## 2023-03-26 NOTE — BH SOCIAL WORK INITIAL PSYCHOSOCIAL EVALUATION - NSBHCHILDEVENTS_PSY_ALL_CORE
Unable to Obtain Pt seems preoccupied at time of assessment, Pt denies concerns during childhood/Stable childhood

## 2023-03-26 NOTE — BH SOCIAL WORK INITIAL PSYCHOSOCIAL EVALUATION - NSPROGENSOURCEINFOFT_PSY_ALL_CORE
Pt, Chart, Tx Team  Unable to assess Pt as pt remains psychotic, guarded, paranoid and internally preoccupied

## 2023-03-26 NOTE — BH SOCIAL WORK INITIAL PSYCHOSOCIAL EVALUATION - NSBHFINANCESOCIAL_PSY_ALL_CORE
None SNAP benefits are provided through an Electronic Benefit Transfer (EBT) card/Supplemental Nutrition Assistance Program (SNAP)

## 2023-03-26 NOTE — BH SOCIAL WORK INITIAL PSYCHOSOCIAL EVALUATION - NSBHLIFEGOAL_PSY_ALL_CORE
Pt. will have appt. for ongoing mental health care within 5-7 days of discharge  Pt. will have safe housing upon discharge.  Social work will follow up to insure Pts. benefits are in place  SW to explore with pt. the interest and need for substance use referral for discharge

## 2023-03-26 NOTE — BH SOCIAL WORK INITIAL PSYCHOSOCIAL EVALUATION - OTHER PAST PSYCHIATRIC HISTORY (INCLUDE DETAILS REGARDING ONSET, COURSE OF ILLNESS, INPATIENT/OUTPATIENT TREATMENT)
As per chart Pt with PPHx schizophrenia, prior hospitalizations with last admission in [facility] (January 2023) Pt with h/o AH intermittently command type to hurt others, no known SA, presents psychotic, disorganized, limited insight into his overall treatment plan, isolative and withdrawn. Pt sexually preoccupied, impaired judgment. Pt with substance abuse; cannabis, alcohol     As per chart Pt with PPHx schizophrenia, prior hospitalizations with last admission in [facility] (January 2023) Pt with h/o AH intermittently command type to hurt others, no known SA, presents psychotic, disorganized, limited insight into his overall treatment plan, isolative and withdrawn. Pt sexually preoccupied, impaired judgment. Pt with substance abuse; cannabis, alcohol.  * Pt reports outpt Psych was approx 3 mos ago- unable to remember provider name.

## 2023-03-27 RX ADMIN — HALOPERIDOL DECANOATE 5 MILLIGRAM(S): 100 INJECTION INTRAMUSCULAR at 09:34

## 2023-03-27 RX ADMIN — Medication 50 MILLIGRAM(S): at 20:50

## 2023-03-27 RX ADMIN — HALOPERIDOL DECANOATE 10 MILLIGRAM(S): 100 INJECTION INTRAMUSCULAR at 20:50

## 2023-03-27 RX ADMIN — Medication 2 MILLIGRAM(S): at 20:50

## 2023-03-28 PROCEDURE — 99233 SBSQ HOSP IP/OBS HIGH 50: CPT

## 2023-03-28 RX ORDER — DIVALPROEX SODIUM 500 MG/1
500 TABLET, DELAYED RELEASE ORAL AT BEDTIME
Refills: 0 | Status: DISCONTINUED | OUTPATIENT
Start: 2023-03-28 | End: 2023-04-04

## 2023-03-28 RX ADMIN — Medication 2 MILLIGRAM(S): at 20:56

## 2023-03-28 RX ADMIN — HALOPERIDOL DECANOATE 10 MILLIGRAM(S): 100 INJECTION INTRAMUSCULAR at 20:56

## 2023-03-28 RX ADMIN — DIVALPROEX SODIUM 500 MILLIGRAM(S): 500 TABLET, DELAYED RELEASE ORAL at 20:56

## 2023-03-28 RX ADMIN — Medication 50 MILLIGRAM(S): at 20:56

## 2023-03-28 NOTE — DOWNTIME INTERRUPTION NOTE - WHICH MANUAL FORMS INITIATED?
See paper documentation due to extended downtime starting Friday March 24, 2023 until 3/28/2023 at 1pm

## 2023-03-29 PROCEDURE — 99232 SBSQ HOSP IP/OBS MODERATE 35: CPT

## 2023-03-29 RX ADMIN — HALOPERIDOL DECANOATE 10 MILLIGRAM(S): 100 INJECTION INTRAMUSCULAR at 21:33

## 2023-03-29 RX ADMIN — Medication 50 MILLIGRAM(S): at 21:33

## 2023-03-29 RX ADMIN — DIVALPROEX SODIUM 500 MILLIGRAM(S): 500 TABLET, DELAYED RELEASE ORAL at 21:33

## 2023-03-30 PROCEDURE — 99232 SBSQ HOSP IP/OBS MODERATE 35: CPT

## 2023-03-30 RX ADMIN — HALOPERIDOL DECANOATE 10 MILLIGRAM(S): 100 INJECTION INTRAMUSCULAR at 21:28

## 2023-03-30 RX ADMIN — DIVALPROEX SODIUM 500 MILLIGRAM(S): 500 TABLET, DELAYED RELEASE ORAL at 21:28

## 2023-03-30 RX ADMIN — Medication 50 MILLIGRAM(S): at 21:28

## 2023-03-31 PROCEDURE — 99232 SBSQ HOSP IP/OBS MODERATE 35: CPT

## 2023-03-31 RX ADMIN — DIVALPROEX SODIUM 500 MILLIGRAM(S): 500 TABLET, DELAYED RELEASE ORAL at 20:16

## 2023-03-31 RX ADMIN — HALOPERIDOL DECANOATE 10 MILLIGRAM(S): 100 INJECTION INTRAMUSCULAR at 20:17

## 2023-03-31 RX ADMIN — Medication 50 MILLIGRAM(S): at 20:17

## 2023-04-01 RX ADMIN — HALOPERIDOL DECANOATE 10 MILLIGRAM(S): 100 INJECTION INTRAMUSCULAR at 20:55

## 2023-04-01 RX ADMIN — Medication 50 MILLIGRAM(S): at 20:55

## 2023-04-01 RX ADMIN — DIVALPROEX SODIUM 500 MILLIGRAM(S): 500 TABLET, DELAYED RELEASE ORAL at 20:55

## 2023-04-02 RX ADMIN — Medication 50 MILLIGRAM(S): at 21:52

## 2023-04-02 RX ADMIN — HALOPERIDOL DECANOATE 10 MILLIGRAM(S): 100 INJECTION INTRAMUSCULAR at 21:52

## 2023-04-02 RX ADMIN — DIVALPROEX SODIUM 500 MILLIGRAM(S): 500 TABLET, DELAYED RELEASE ORAL at 21:52

## 2023-04-03 PROCEDURE — 99232 SBSQ HOSP IP/OBS MODERATE 35: CPT

## 2023-04-03 RX ADMIN — HALOPERIDOL DECANOATE 10 MILLIGRAM(S): 100 INJECTION INTRAMUSCULAR at 20:13

## 2023-04-03 RX ADMIN — Medication 50 MILLIGRAM(S): at 20:13

## 2023-04-03 RX ADMIN — DIVALPROEX SODIUM 500 MILLIGRAM(S): 500 TABLET, DELAYED RELEASE ORAL at 20:14

## 2023-04-03 NOTE — BH TREATMENT PLAN - NSTXCAREGIVERPARTICIPATE_PSY_P_CORE
Family/Caregiver participated in identification of needs/problems/goals for treatment/Family/Caregiver participated in defining interventions
No, patient unwilling to involve family/caregiver

## 2023-04-03 NOTE — BH TREATMENT PLAN - NSTXPLANTHERAPYSESSIONSFT_PSY_ALL_CORE
03-28-23  Type of therapy: Coping skills  Type of session: Group  Level of patient participation: Participates  Duration of participation: 60 minutes  Therapy conducted by: Psych rehab  Therapy Summary: Patient meaningfully engaged in Morning Coping Skills Group. Patient was appropriate and able to engage in the group topics and with his peers    03-29-23  Type of therapy: Self esteem  Type of session: Group  Level of patient participation: Engaged,Participates  Duration of participation: 60 minutes  Therapy conducted by: Psych rehab  Therapy Summary: Patient attended Afternoon Self Esteem Group and was able to participate with support and education from this writer.    03-30-23  Type of therapy: Creative arts therapy  Type of session: Group  Level of patient participation: Participates  Duration of participation: 60 minutes  Therapy conducted by: Psych rehab  Therapy Summary: Patient attended recreational programming. Initiated conversation and engaged in group activity.    03-31-23  Type of therapy: Leisure development,Music therapy  Type of session: Group  Level of patient participation: Engaged,Participates  Duration of participation: 60 minutes  Therapy conducted by: Psych rehab  Therapy Summary: Patient participated in Evening Music Therapy Group.    04-03-23  Type of therapy: Creative arts therapy,Self esteem  Type of session: Group  Level of patient participation: Engaged,Participates  Duration of participation: 60 minutes  Therapy conducted by: Psych rehab  Therapy Summary: Patient participated in Morning Self Esteem/Strengths Group.

## 2023-04-03 NOTE — BH TREATMENT PLAN - NSDCCRITERIA_PSY_ALL_CORE
pt with stable mood and affect and without and hallucination
pt with stable mood and affect and without and hallucination

## 2023-04-03 NOTE — BH TREATMENT PLAN - NSTXDCOTHRINTERSW_PSY_ALL_CORE
will meet with patient to address above stated goals.
Spoke with Aspen from [facility] who has a Supported Housing opening. Patient advised and is agreeable with being interviewed for same. Aspen aware and will forward application.  Also spoke with patient's mother, sister - Jovanny, and [employer] [facility] [employer] who are all encouraging patient to accept placement.

## 2023-04-03 NOTE — BH TREATMENT PLAN - NSTXDISORGGOAL_PSY_ALL_CORE
Will demonstrate purposeful and predictable thoughts/behaviors by making a request
Will demonstrate purposeful and predictable thoughts/behaviors by making a request

## 2023-04-04 PROCEDURE — 99233 SBSQ HOSP IP/OBS HIGH 50: CPT

## 2023-04-04 RX ORDER — DIVALPROEX SODIUM 500 MG/1
250 TABLET, DELAYED RELEASE ORAL
Refills: 0 | Status: DISCONTINUED | OUTPATIENT
Start: 2023-04-04 | End: 2023-04-15

## 2023-04-04 RX ADMIN — Medication 50 MILLIGRAM(S): at 20:23

## 2023-04-04 RX ADMIN — HALOPERIDOL DECANOATE 10 MILLIGRAM(S): 100 INJECTION INTRAMUSCULAR at 20:23

## 2023-04-04 RX ADMIN — DIVALPROEX SODIUM 250 MILLIGRAM(S): 500 TABLET, DELAYED RELEASE ORAL at 20:23

## 2023-04-04 NOTE — BH INPATIENT PSYCHIATRY PROGRESS NOTE - NSBHTIMEACTIVITIESPERFORMED_PSY_A_CORE
OCCUPATIONAL THERAPY                          OT following to attempt evaluation. Patient remains intubated/sedated. Will follow and re-attempt at a later date when appropriate. Discussed with RN.
OCCUPATIONAL THERAPY                          Patient remains intubated and sedated. Will follow and re-attempt at a later date when appropriate.
OCCUPATIONAL THERAPY                        OT order received, chart reviewed. The patient is going for craniotomy today. Will hold and re-attempt at a later date when more medically stable.
Orders received charts reviewed. Pt not appropriate at this time per RN follow up 2/19.
1. interviewed the pt to assess current mental status  2. reviewed medications pt has been compliant and is refusing the need for conversion to injectable    3. reviewed lab results   4. conferred with  social work for aftercare planning  5.spome  with pt's sister about aftercare and place for his to live. 
1. interviewed the pt to assess current mental status  2. reviewed medications  and started on haldol   3. reviewed lab results   4. conferred with  nursing for behavior on the unit   
1. interviewed the pt to assess current mental status  2. reviewed medications    3. reviewed lab results   4. conferred with    5. conferred with

## 2023-04-05 PROCEDURE — 99232 SBSQ HOSP IP/OBS MODERATE 35: CPT

## 2023-04-05 RX ADMIN — HALOPERIDOL DECANOATE 10 MILLIGRAM(S): 100 INJECTION INTRAMUSCULAR at 20:27

## 2023-04-05 RX ADMIN — DIVALPROEX SODIUM 250 MILLIGRAM(S): 500 TABLET, DELAYED RELEASE ORAL at 09:27

## 2023-04-05 RX ADMIN — DIVALPROEX SODIUM 250 MILLIGRAM(S): 500 TABLET, DELAYED RELEASE ORAL at 20:26

## 2023-04-05 RX ADMIN — Medication 50 MILLIGRAM(S): at 20:27

## 2023-04-06 PROCEDURE — 99232 SBSQ HOSP IP/OBS MODERATE 35: CPT

## 2023-04-06 RX ADMIN — DIVALPROEX SODIUM 250 MILLIGRAM(S): 500 TABLET, DELAYED RELEASE ORAL at 09:17

## 2023-04-06 RX ADMIN — HALOPERIDOL DECANOATE 10 MILLIGRAM(S): 100 INJECTION INTRAMUSCULAR at 20:35

## 2023-04-06 RX ADMIN — Medication 50 MILLIGRAM(S): at 20:35

## 2023-04-06 RX ADMIN — DIVALPROEX SODIUM 250 MILLIGRAM(S): 500 TABLET, DELAYED RELEASE ORAL at 20:15

## 2023-04-07 PROCEDURE — 99232 SBSQ HOSP IP/OBS MODERATE 35: CPT

## 2023-04-07 RX ADMIN — DIVALPROEX SODIUM 250 MILLIGRAM(S): 500 TABLET, DELAYED RELEASE ORAL at 20:37

## 2023-04-07 RX ADMIN — DIVALPROEX SODIUM 250 MILLIGRAM(S): 500 TABLET, DELAYED RELEASE ORAL at 09:15

## 2023-04-07 RX ADMIN — HALOPERIDOL DECANOATE 10 MILLIGRAM(S): 100 INJECTION INTRAMUSCULAR at 20:20

## 2023-04-07 RX ADMIN — Medication 50 MILLIGRAM(S): at 20:21

## 2023-04-08 RX ORDER — HYDROXYZINE HCL 10 MG
50 TABLET ORAL EVERY 6 HOURS
Refills: 0 | Status: DISCONTINUED | OUTPATIENT
Start: 2023-04-08 | End: 2023-04-14

## 2023-04-08 RX ADMIN — DIVALPROEX SODIUM 250 MILLIGRAM(S): 500 TABLET, DELAYED RELEASE ORAL at 20:08

## 2023-04-08 RX ADMIN — Medication 50 MILLIGRAM(S): at 20:08

## 2023-04-08 RX ADMIN — DIVALPROEX SODIUM 250 MILLIGRAM(S): 500 TABLET, DELAYED RELEASE ORAL at 10:12

## 2023-04-08 RX ADMIN — Medication 50 MILLIGRAM(S): at 15:17

## 2023-04-08 RX ADMIN — HALOPERIDOL DECANOATE 10 MILLIGRAM(S): 100 INJECTION INTRAMUSCULAR at 20:08

## 2023-04-09 RX ADMIN — Medication 50 MILLIGRAM(S): at 21:00

## 2023-04-09 RX ADMIN — HALOPERIDOL DECANOATE 10 MILLIGRAM(S): 100 INJECTION INTRAMUSCULAR at 21:00

## 2023-04-09 RX ADMIN — DIVALPROEX SODIUM 250 MILLIGRAM(S): 500 TABLET, DELAYED RELEASE ORAL at 11:04

## 2023-04-09 RX ADMIN — DIVALPROEX SODIUM 250 MILLIGRAM(S): 500 TABLET, DELAYED RELEASE ORAL at 21:00

## 2023-04-10 PROCEDURE — 99232 SBSQ HOSP IP/OBS MODERATE 35: CPT

## 2023-04-10 RX ADMIN — DIVALPROEX SODIUM 250 MILLIGRAM(S): 500 TABLET, DELAYED RELEASE ORAL at 07:58

## 2023-04-10 RX ADMIN — HALOPERIDOL DECANOATE 10 MILLIGRAM(S): 100 INJECTION INTRAMUSCULAR at 20:35

## 2023-04-10 RX ADMIN — Medication 50 MILLIGRAM(S): at 16:56

## 2023-04-10 RX ADMIN — Medication 50 MILLIGRAM(S): at 20:35

## 2023-04-10 RX ADMIN — DIVALPROEX SODIUM 250 MILLIGRAM(S): 500 TABLET, DELAYED RELEASE ORAL at 20:36

## 2023-04-11 PROCEDURE — 99222 1ST HOSP IP/OBS MODERATE 55: CPT

## 2023-04-11 PROCEDURE — 99232 SBSQ HOSP IP/OBS MODERATE 35: CPT

## 2023-04-11 RX ADMIN — Medication 50 MILLIGRAM(S): at 20:13

## 2023-04-11 RX ADMIN — DIVALPROEX SODIUM 250 MILLIGRAM(S): 500 TABLET, DELAYED RELEASE ORAL at 20:14

## 2023-04-11 RX ADMIN — Medication 50 MILLIGRAM(S): at 14:41

## 2023-04-11 RX ADMIN — HALOPERIDOL DECANOATE 10 MILLIGRAM(S): 100 INJECTION INTRAMUSCULAR at 20:13

## 2023-04-11 RX ADMIN — DIVALPROEX SODIUM 250 MILLIGRAM(S): 500 TABLET, DELAYED RELEASE ORAL at 08:39

## 2023-04-11 NOTE — H&P ADULT - NSHPPHYSICALEXAM_GEN_ALL_CORE
Vital Signs Last 24 Hrs  T(C): 36.4 (11 Apr 2023 07:15), Max: 36.4 (11 Apr 2023 07:15)  T(F): 97.6 (11 Apr 2023 07:15), Max: 97.6 (11 Apr 2023 07:15)  HR: --  BP: --  BP(mean): --  RR: 16 (11 Apr 2023 07:15) (16 - 16)  SpO2: 97% (11 Apr 2023 07:15) (97% - 97%)    Parameters below as of 11 Apr 2023 07:15  Patient On (Oxygen Delivery Method): room air    GEN: lying in bed, NAD  HEENT:   NC/AT, pupils equal and reactive, EOMI  CV:  +S1, +S2, RRR  RESP:   lungs clear to auscultation bilaterally, no wheeze, rales, rhonchi   BREAST:  not examined  GI:  abdomen soft, non-tender, non-distended, normoactive BS  RECTAL:  not examined  :  not examined  MSK:   normal muscle tone  EXT:  no edema  NEURO:  AAOX3, no focal neurological deficits  SKIN:  no rashes

## 2023-04-11 NOTE — H&P ADULT - ASSESSMENT
pt is 26 yo male here with homicidal ideation    # homicidal ideation   - plan as per psych    # marijuana use - urged pt to quit  - psych following     dvt proph - ambulates     will sign off please recall prn  pt should follow up at [facility] for medical follow up if needed

## 2023-04-11 NOTE — H&P ADULT - HISTORY OF PRESENT ILLNESS
pt is 28 yo male with no significant PMH here with homicidal thoughts.  pt currently states he feels well and is eager to go home.  does not currently follow up with primary doctor and i spoke to him about following up at [facility] which he was agreeable to.  no cp, sob, n/v/d     pmh - none    psh - none    allergies - NKDA    FH - mother and father healthy     SH - tob - denies  - etoh - denies  drugs - occasional marijuana only

## 2023-04-11 NOTE — H&P ADULT - NSHPLABSRESULTS_GEN_ALL_CORE
labs reviewed    < from: 12 Lead ECG (03.23.23 @ 10:33) >      Diagnosis Line Normal sinus rhythm  Normal ECG  No previous ECGs available  Confirmed by DEACON BOYCE, MARYANN ([id number]) on 3/23/2023 4:48:44 PM    < end of copied text >

## 2023-04-12 RX ADMIN — DIVALPROEX SODIUM 250 MILLIGRAM(S): 500 TABLET, DELAYED RELEASE ORAL at 09:34

## 2023-04-12 RX ADMIN — Medication 50 MILLIGRAM(S): at 21:01

## 2023-04-12 RX ADMIN — HALOPERIDOL DECANOATE 10 MILLIGRAM(S): 100 INJECTION INTRAMUSCULAR at 21:01

## 2023-04-13 PROCEDURE — 99232 SBSQ HOSP IP/OBS MODERATE 35: CPT

## 2023-04-13 RX ADMIN — HALOPERIDOL DECANOATE 10 MILLIGRAM(S): 100 INJECTION INTRAMUSCULAR at 20:23

## 2023-04-13 RX ADMIN — DIVALPROEX SODIUM 250 MILLIGRAM(S): 500 TABLET, DELAYED RELEASE ORAL at 09:29

## 2023-04-13 RX ADMIN — DIVALPROEX SODIUM 250 MILLIGRAM(S): 500 TABLET, DELAYED RELEASE ORAL at 20:43

## 2023-04-13 RX ADMIN — Medication 50 MILLIGRAM(S): at 20:23

## 2023-04-14 PROCEDURE — 99232 SBSQ HOSP IP/OBS MODERATE 35: CPT

## 2023-04-14 RX ORDER — TRAZODONE HCL 50 MG
1 TABLET ORAL
Qty: 0 | Refills: 0 | DISCHARGE
Start: 2023-04-14

## 2023-04-14 RX ORDER — HALOPERIDOL DECANOATE 100 MG/ML
1 INJECTION INTRAMUSCULAR
Qty: 0 | Refills: 0 | DISCHARGE
Start: 2023-04-14

## 2023-04-14 RX ORDER — DIVALPROEX SODIUM 500 MG/1
1 TABLET, DELAYED RELEASE ORAL
Qty: 0 | Refills: 0 | DISCHARGE
Start: 2023-04-14

## 2023-04-14 RX ADMIN — HALOPERIDOL DECANOATE 10 MILLIGRAM(S): 100 INJECTION INTRAMUSCULAR at 20:50

## 2023-04-14 RX ADMIN — DIVALPROEX SODIUM 250 MILLIGRAM(S): 500 TABLET, DELAYED RELEASE ORAL at 11:02

## 2023-04-14 RX ADMIN — DIVALPROEX SODIUM 250 MILLIGRAM(S): 500 TABLET, DELAYED RELEASE ORAL at 20:56

## 2023-04-14 RX ADMIN — Medication 50 MILLIGRAM(S): at 20:50

## 2023-04-14 NOTE — BH INPATIENT PSYCHIATRY PROGRESS NOTE - NSBHFUPINTERVALHXFT_PSY_A_CORE
Pt family more involved with supporting pt at this time Pt with difficulty with place of residence
Pt denies any suicidal ideation intent or plan   Pt denies any side effects from medi ation Pt denies any want  for long acting injectable medication 
Spoke with the pt and consulted with CSW . Pt with [facility] housing application done.  Pt still verbalizing wish to return to live with mother but sheis not willing to continue with the living arrangement.   Pt with increased goal directed speech .He id denying any suicidal ideation intent or plan 
Covering MD Note ( 3/25/23)   Pt seen in his room. Remains floridly psychotic with affective lability. Talking to himself. Tearful. Guarded and paranoid. + internally preoccupied.   Tolerating Haldol increased to 5 mg po q am and 10 mg po qhs. Isolative and withdrawn. Pt sexually preoccupied. Impaired judgment.   Pt with current low risk of suicide but moderate risk of psychosis related aggression . + HI prior to admission though pt does not report this currently. 
Covering MD Note ( 3/26/23)   Pt seen in his room where he appears to prefer to remain , withdrawn and socially isolative after complying with vital signs, medications and meals. The pt remains psychotic with affective lability presenting with mild elation rather than with dysphoria as the pt had presented 24 hours earlier. The pt's eye contact is largely furtive with pt head bowed and away from others. Guarded and paranoid. + internally preoccupied.   The pt's treatment plan has continued to be updated but the pt has currently expressed limited insight into his overall treatment plan. Tolerating Haldol increased to 5 mg po q am and 10 mg po qhs. Isolative and withdrawn. Pt sexually preoccupied. Impaired judgment. The pt has continued to decline attendance at therapy groups despite on going staff support and encouragement. Dual diagnosis treatment will remain key to the pt's ideal after care treatment plan though it remains unclear if the pt will agree to consider this medically necessary multimodal treatment.   Pt with current low risk of suicide but moderate risk of psychosis related aggression . + HI prior to admission though pt does not report this currently. 
Pt still with no place to live. Pt applying for [facility] housing but most likely with a gap.   Spoke with his sister whom pt had called in hopes of getting a place to stay . She is planning to call CSW and to locate some other place for him . Family not wanting to have pt stay with them. 
spoke with his mother who indicated that pt when psychotic was "breaking furniture the walls.Mother  claiming that pt will not be able to live with her and her .   Pt currently with decreased anxiety  has improved eye contact , denies any hallucinations 
Pt with improved eye contact, alert Pt with denial of any suicidal ideation intent or plan Pt with denial of any side effect from medication.  Pt with denial of any suicidal ideation intent or plan 
Pt still wanting to return home .  Family is not planning to allow him to continue with living with them.  Pt working with CSW for place to stay.as aftercare treatment 
Pt was unsuccessful in his application for housing.  CSW attempting to locate other options.  
Pt denies any suicidal ideation intent or plan Pt with compliance with medication and working with social work for aftercare planning 
pt with gradual improved goal directed speech   Pt denies any side effect from medication   Pt with no housing as the mother is refusing to house him
Pt willing to continue with the depakote as the pill size agrees with him better than the 500mg tablets.  Pt still awaiting aftercare planning including where he is  to be living   Pt remaining with blunted constricted mood and affect .  Pt with denial of any suicidal ideation intent or plan 
Pt remaining with grandiose paranoid delusion. Pt with loosening of association ,flight of ideas is sexually preoccupied and oddly related.  Pt was reportedly on invega .  Will increase the haldol to total of 15mg daily. and added cogentin for prophylaxis for EPS. 
Pt with application for [facility] housing. Pt with little insight and is stil with the wish to return home.

## 2023-04-14 NOTE — BH INPATIENT PSYCHIATRY PROGRESS NOTE - NSBHMSESPEECH_PSY_A_CORE
Normal volume, rate, productivity, spontaneity and articulation
Normal volume, rate, productivity, spontaneity and articulation
Abnormal as indicated, otherwise normal...
Normal volume, rate, productivity, spontaneity and articulation
Abnormal as indicated, otherwise normal...
Normal volume, rate, productivity, spontaneity and articulation
Abnormal as indicated, otherwise normal...
Normal volume, rate, productivity, spontaneity and articulation
Normal volume, rate, productivity, spontaneity and articulation
Abnormal as indicated, otherwise normal...
Normal volume, rate, productivity, spontaneity and articulation
Normal volume, rate, productivity, spontaneity and articulation

## 2023-04-14 NOTE — BH INPATIENT PSYCHIATRY PROGRESS NOTE - NSTXCOPEDATETRGT_PSY_ALL_CORE
31-Mar-2023
05-Apr-2023
05-Apr-2023
13-Apr-2023
13-Apr-2023
05-Apr-2023
31-Mar-2023
13-Apr-2023
20-Apr-2023
13-Apr-2023
31-Mar-2023
20-Apr-2023
05-Apr-2023
13-Apr-2023
31-Mar-2023
05-Apr-2023

## 2023-04-14 NOTE — BH INPATIENT PSYCHIATRY PROGRESS NOTE - NSBHPSYCHOLCOGORIENT_PSY_A_CORE
Unable to assess
Oriented to time, place, person, situation
Unable to assess
Oriented to time, place, person, situation
Oriented to time, place, person, situation
Unable to assess
Unable to assess
Oriented to time, place, person, situation
Unable to assess
Oriented to time, place, person, situation
Unable to assess

## 2023-04-14 NOTE — BH INPATIENT PSYCHIATRY PROGRESS NOTE - NSBHMSEMOOD_PSY_A_CORE
Euphoria
Normal/Euphoria
Euphoria
Euphoria
Normal/Euphoria

## 2023-04-14 NOTE — BH INPATIENT PSYCHIATRY PROGRESS NOTE - NSBHMSEINTELL_PSY_A_CORE
Average
Unable to assess
Unable to assess
Average
Average
Unable to assess
Average
Unable to assess
Unable to assess
Average
Unable to assess
Average
Unable to assess
Unable to assess
Average
Average

## 2023-04-14 NOTE — BH INPATIENT PSYCHIATRY DISCHARGE NOTE - HPI (INCLUDE ILLNESS QUALITY, SEVERITY, DURATION, TIMING, CONTEXT, MODIFYING FACTORS, ASSOCIATED SIGNS AND SYMPTOMS)
Pt is a 28 yo male, with h/o schizophrenia, domiciled with father, unemployed, multiple prior hospitalizations, no known SA, presents psychotic, disorganised, inappropriately laughing, sticking his tongue out, sending kisses , stating "I love  you", responding to  internal stimuli. Pt is disorganized with impaired  comprehension. Can not provide info about his hx. Most info obtained from old records in [facility].  see more from last admission in [facility] In 2023:  "denies medical hx, hx of extensive cannabis use, presented initially to [facility] after being found faced down in the woods, found to be psychotic, transferred to [facility]. Pt now transferred to [facility] due to testing + for COVID. At [facility], patient was described as cooperative, disorganized, making bizarre statements including “demons are telling [him] to do things.” Patient also noted an increase in his alcohol consumption. He endorsed AH that were intermittently command type to hurt others. Per reports received, patient’s father was hospitalized due to seizure and patient thought his father . Patient was started on Risperdal, titrated to 4mg qhs and pt agreed to trial of Invega Sustenna. Per handoff, prior auth obtained by [facility] for Invega Sustenna. On initial presentation to [facility], patient reports being found outside and being brought to [facility]. He is disorganized in his responses, states that he was very worried that his father had , but was recently reassured that he is actually alive. He reports a dx of Schizophrenia, endorses cannabis use, reports having AH mostly “of random things,” but chart report suggests past CAH to hurt others. Patient adherent to Risperdal, was receiving 4mg qhs at [facility]. Patient also endorsing feeling nervous and some paranoia. Pt denies manic sx. Denies depressive sx. Denies si and hi. Reports casual alcohol use. denies withdrawals, past detox or rehabs.  	     Past History:  Past Psychiatric History (Include Details Regarding Onset, Course of Illness, Inpatient/Outpatient Treatment)	1 prior hospitalization in 2019   dx of schizophrenia   denies prior suicide attempt"    NO collateral available

## 2023-04-14 NOTE — BH INPATIENT PSYCHIATRY PROGRESS NOTE - NSBHFUPINTERVALCCFT_PSY_A_CORE
" I feel alright. I want to go home."
"how long do I stay here"
Maybe I can stay with my sister. 
when do I get discharged"
"I'm feeling well"
"I would louie to go home. 
"when do I go home"
"When do I go home 
"I'm feeling alright when do I go home"
"I m ready to be discharged "
"When do I get out of here
"When do I get to go home"
"When do I go home
"I don't need to be here . Nothing wrong"
" Alright"

## 2023-04-14 NOTE — BH INPATIENT PSYCHIATRY PROGRESS NOTE - CURRENT MEDICATION
MEDICATIONS  (STANDING):  diphenhydrAMINE Injectable 50 milliGRAM(s) IntraMuscular once  divalproex  milliGRAM(s) Oral two times a day  haloperidol     Tablet 10 milliGRAM(s) Oral at bedtime  haloperidol    Injectable 7.5 milliGRAM(s) IntraMuscular once  traZODone 50 milliGRAM(s) Oral at bedtime    MEDICATIONS  (PRN):  acetaminophen     Tablet .. 650 milliGRAM(s) Oral once PRN Temp greater or equal to 38C (100.4F), Mild Pain (1 - 3), Moderate Pain (4 - 6)  aluminum hydroxide/magnesium hydroxide/simethicone Suspension 30 milliLiter(s) Oral every 6 hours PRN Dyspepsia  diphenhydrAMINE 50 milliGRAM(s) Oral every 6 hours PRN Extrapyramidal prophylaxis  diphenhydrAMINE Elixir 50 milliGRAM(s) Oral once PRN Extrapyramidal prophylaxis  haloperidol     Tablet 5 milliGRAM(s) Oral every 6 hours PRN moderate psychotic agitation  haloperidol    Injectable 5 milliGRAM(s) IntraMuscular once PRN severe psychotic agitation  hydrOXYzine hydrochloride 50 milliGRAM(s) Oral every 6 hours PRN anxiety  magnesium hydroxide Suspension 30 milliLiter(s) Oral daily PRN Constipation  
MEDICATIONS  (STANDING):  divalproex  milliGRAM(s) Oral two times a day  haloperidol     Tablet 10 milliGRAM(s) Oral at bedtime  haloperidol    Injectable 7.5 milliGRAM(s) IntraMuscular once  traZODone 50 milliGRAM(s) Oral at bedtime    MEDICATIONS  (PRN):  diphenhydrAMINE 50 milliGRAM(s) Oral every 6 hours PRN Extrapyramidal prophylaxis  diphenhydrAMINE Elixir 50 milliGRAM(s) Oral once PRN Extrapyramidal prophylaxis  
MEDICATIONS  (STANDING):  diphenhydrAMINE Injectable 50 milliGRAM(s) IntraMuscular once  divalproex  milliGRAM(s) Oral two times a day  haloperidol     Tablet 10 milliGRAM(s) Oral at bedtime  haloperidol    Injectable 7.5 milliGRAM(s) IntraMuscular once  traZODone 50 milliGRAM(s) Oral at bedtime    MEDICATIONS  (PRN):  acetaminophen     Tablet .. 650 milliGRAM(s) Oral once PRN Temp greater or equal to 38C (100.4F), Mild Pain (1 - 3), Moderate Pain (4 - 6)  aluminum hydroxide/magnesium hydroxide/simethicone Suspension 30 milliLiter(s) Oral every 6 hours PRN Dyspepsia  diphenhydrAMINE 50 milliGRAM(s) Oral every 6 hours PRN Extrapyramidal prophylaxis  diphenhydrAMINE Elixir 50 milliGRAM(s) Oral once PRN Extrapyramidal prophylaxis  haloperidol     Tablet 5 milliGRAM(s) Oral every 6 hours PRN moderate psychotic agitation  haloperidol    Injectable 5 milliGRAM(s) IntraMuscular once PRN severe psychotic agitation  hydrOXYzine hydrochloride 50 milliGRAM(s) Oral every 6 hours PRN anxiety  magnesium hydroxide Suspension 30 milliLiter(s) Oral daily PRN Constipation  
MEDICATIONS  (STANDING):  diphenhydrAMINE Injectable 50 milliGRAM(s) IntraMuscular once  divalproex  milliGRAM(s) Oral two times a day  haloperidol     Tablet 10 milliGRAM(s) Oral at bedtime  haloperidol    Injectable 7.5 milliGRAM(s) IntraMuscular once  traZODone 50 milliGRAM(s) Oral at bedtime    MEDICATIONS  (PRN):  acetaminophen     Tablet .. 650 milliGRAM(s) Oral once PRN Temp greater or equal to 38C (100.4F), Mild Pain (1 - 3), Moderate Pain (4 - 6)  aluminum hydroxide/magnesium hydroxide/simethicone Suspension 30 milliLiter(s) Oral every 6 hours PRN Dyspepsia  diphenhydrAMINE 50 milliGRAM(s) Oral every 6 hours PRN Extrapyramidal prophylaxis  diphenhydrAMINE Elixir 50 milliGRAM(s) Oral once PRN Extrapyramidal prophylaxis  haloperidol     Tablet 5 milliGRAM(s) Oral every 6 hours PRN moderate psychotic agitation  haloperidol    Injectable 5 milliGRAM(s) IntraMuscular once PRN severe psychotic agitation  magnesium hydroxide Suspension 30 milliLiter(s) Oral daily PRN Constipation  
MEDICATIONS  (STANDING):  diphenhydrAMINE Injectable 50 milliGRAM(s) IntraMuscular once  divalproex  milliGRAM(s) Oral at bedtime  haloperidol     Tablet 10 milliGRAM(s) Oral at bedtime  haloperidol    Injectable 7.5 milliGRAM(s) IntraMuscular once  traZODone 50 milliGRAM(s) Oral at bedtime    MEDICATIONS  (PRN):  acetaminophen     Tablet .. 650 milliGRAM(s) Oral once PRN Temp greater or equal to 38C (100.4F), Mild Pain (1 - 3), Moderate Pain (4 - 6)  aluminum hydroxide/magnesium hydroxide/simethicone Suspension 30 milliLiter(s) Oral every 6 hours PRN Dyspepsia  diphenhydrAMINE 50 milliGRAM(s) Oral every 6 hours PRN Extrapyramidal prophylaxis  diphenhydrAMINE Elixir 50 milliGRAM(s) Oral once PRN Extrapyramidal prophylaxis  haloperidol     Tablet 5 milliGRAM(s) Oral every 6 hours PRN moderate psychotic agitation  haloperidol    Injectable 5 milliGRAM(s) IntraMuscular once PRN severe psychotic agitation  magnesium hydroxide Suspension 30 milliLiter(s) Oral daily PRN Constipation  
MEDICATIONS  (STANDING):  diphenhydrAMINE Injectable 50 milliGRAM(s) IntraMuscular once  divalproex  milliGRAM(s) Oral at bedtime  haloperidol     Tablet 10 milliGRAM(s) Oral at bedtime  haloperidol    Injectable 7.5 milliGRAM(s) IntraMuscular once  traZODone 50 milliGRAM(s) Oral at bedtime    MEDICATIONS  (PRN):  acetaminophen     Tablet .. 650 milliGRAM(s) Oral once PRN Temp greater or equal to 38C (100.4F), Mild Pain (1 - 3), Moderate Pain (4 - 6)  aluminum hydroxide/magnesium hydroxide/simethicone Suspension 30 milliLiter(s) Oral every 6 hours PRN Dyspepsia  diphenhydrAMINE 50 milliGRAM(s) Oral every 6 hours PRN Extrapyramidal prophylaxis  diphenhydrAMINE Elixir 50 milliGRAM(s) Oral once PRN Extrapyramidal prophylaxis  haloperidol     Tablet 5 milliGRAM(s) Oral every 6 hours PRN moderate psychotic agitation  haloperidol    Injectable 5 milliGRAM(s) IntraMuscular once PRN severe psychotic agitation  magnesium hydroxide Suspension 30 milliLiter(s) Oral daily PRN Constipation  
MEDICATIONS  (STANDING):  diphenhydrAMINE Injectable 50 milliGRAM(s) IntraMuscular once  divalproex  milliGRAM(s) Oral at bedtime  haloperidol     Tablet 10 milliGRAM(s) Oral at bedtime  haloperidol    Injectable 7.5 milliGRAM(s) IntraMuscular once  traZODone 50 milliGRAM(s) Oral at bedtime    MEDICATIONS  (PRN):  acetaminophen     Tablet .. 650 milliGRAM(s) Oral once PRN Temp greater or equal to 38C (100.4F), Mild Pain (1 - 3), Moderate Pain (4 - 6)  aluminum hydroxide/magnesium hydroxide/simethicone Suspension 30 milliLiter(s) Oral every 6 hours PRN Dyspepsia  diphenhydrAMINE 50 milliGRAM(s) Oral every 6 hours PRN Extrapyramidal prophylaxis  diphenhydrAMINE Elixir 50 milliGRAM(s) Oral once PRN Extrapyramidal prophylaxis  haloperidol     Tablet 5 milliGRAM(s) Oral every 6 hours PRN moderate psychotic agitation  haloperidol    Injectable 5 milliGRAM(s) IntraMuscular once PRN severe psychotic agitation  LORazepam     Tablet 2 milliGRAM(s) Oral every 6 hours PRN moderate psychotic agitation  LORazepam   Injectable 2 milliGRAM(s) IntraMuscular once PRN severe psychotic agitation  magnesium hydroxide Suspension 30 milliLiter(s) Oral daily PRN Constipation  
MEDICATIONS  (STANDING):  diphenhydrAMINE Injectable 50 milliGRAM(s) IntraMuscular once  divalproex  milliGRAM(s) Oral at bedtime  haloperidol     Tablet 10 milliGRAM(s) Oral at bedtime  haloperidol    Injectable 7.5 milliGRAM(s) IntraMuscular once  traZODone 50 milliGRAM(s) Oral at bedtime    MEDICATIONS  (PRN):  acetaminophen     Tablet .. 650 milliGRAM(s) Oral once PRN Temp greater or equal to 38C (100.4F), Mild Pain (1 - 3), Moderate Pain (4 - 6)  aluminum hydroxide/magnesium hydroxide/simethicone Suspension 30 milliLiter(s) Oral every 6 hours PRN Dyspepsia  diphenhydrAMINE 50 milliGRAM(s) Oral every 6 hours PRN Extrapyramidal prophylaxis  diphenhydrAMINE Elixir 50 milliGRAM(s) Oral once PRN Extrapyramidal prophylaxis  haloperidol     Tablet 5 milliGRAM(s) Oral every 6 hours PRN moderate psychotic agitation  haloperidol    Injectable 5 milliGRAM(s) IntraMuscular once PRN severe psychotic agitation  LORazepam     Tablet 2 milliGRAM(s) Oral every 6 hours PRN moderate psychotic agitation  LORazepam   Injectable 2 milliGRAM(s) IntraMuscular once PRN severe psychotic agitation  magnesium hydroxide Suspension 30 milliLiter(s) Oral daily PRN Constipation  
MEDICATIONS  (STANDING):  haloperidol     Tablet 5 milliGRAM(s) Oral daily  haloperidol     Tablet 10 milliGRAM(s) Oral at bedtime  traZODone 50 milliGRAM(s) Oral at bedtime    MEDICATIONS  (PRN):  acetaminophen     Tablet .. 650 milliGRAM(s) Oral once PRN Temp greater or equal to 38C (100.4F), Mild Pain (1 - 3), Moderate Pain (4 - 6)  aluminum hydroxide/magnesium hydroxide/simethicone Suspension 30 milliLiter(s) Oral every 6 hours PRN Dyspepsia  diphenhydrAMINE 50 milliGRAM(s) Oral every 6 hours PRN Extrapyramidal prophylaxis  diphenhydrAMINE Elixir 50 milliGRAM(s) Oral once PRN Extrapyramidal prophylaxis  haloperidol     Tablet 5 milliGRAM(s) Oral every 6 hours PRN moderate psychotic agitation  haloperidol    Injectable 5 milliGRAM(s) IntraMuscular once PRN severe psychotic agitation  LORazepam     Tablet 2 milliGRAM(s) Oral every 6 hours PRN moderate psychotic agitation  LORazepam   Injectable 2 milliGRAM(s) IntraMuscular once PRN severe psychotic agitation  magnesium hydroxide Suspension 30 milliLiter(s) Oral daily PRN Constipation  
MEDICATIONS  (STANDING):  diphenhydrAMINE Injectable 50 milliGRAM(s) IntraMuscular once  haloperidol     Tablet 5 milliGRAM(s) Oral daily  haloperidol     Tablet 10 milliGRAM(s) Oral at bedtime  haloperidol    Injectable 7.5 milliGRAM(s) IntraMuscular once  traZODone 50 milliGRAM(s) Oral at bedtime    MEDICATIONS  (PRN):  acetaminophen     Tablet .. 650 milliGRAM(s) Oral once PRN Temp greater or equal to 38C (100.4F), Mild Pain (1 - 3), Moderate Pain (4 - 6)  aluminum hydroxide/magnesium hydroxide/simethicone Suspension 30 milliLiter(s) Oral every 6 hours PRN Dyspepsia  diphenhydrAMINE 50 milliGRAM(s) Oral every 6 hours PRN Extrapyramidal prophylaxis  diphenhydrAMINE Elixir 50 milliGRAM(s) Oral once PRN Extrapyramidal prophylaxis  haloperidol     Tablet 5 milliGRAM(s) Oral every 6 hours PRN moderate psychotic agitation  haloperidol    Injectable 5 milliGRAM(s) IntraMuscular once PRN severe psychotic agitation  LORazepam     Tablet 2 milliGRAM(s) Oral every 6 hours PRN moderate psychotic agitation  LORazepam   Injectable 2 milliGRAM(s) IntraMuscular once PRN severe psychotic agitation  magnesium hydroxide Suspension 30 milliLiter(s) Oral daily PRN Constipation  
MEDICATIONS  (STANDING):  diphenhydrAMINE Injectable 50 milliGRAM(s) IntraMuscular once  divalproex  milliGRAM(s) Oral two times a day  haloperidol     Tablet 10 milliGRAM(s) Oral at bedtime  haloperidol    Injectable 7.5 milliGRAM(s) IntraMuscular once  traZODone 50 milliGRAM(s) Oral at bedtime    MEDICATIONS  (PRN):  acetaminophen     Tablet .. 650 milliGRAM(s) Oral once PRN Temp greater or equal to 38C (100.4F), Mild Pain (1 - 3), Moderate Pain (4 - 6)  aluminum hydroxide/magnesium hydroxide/simethicone Suspension 30 milliLiter(s) Oral every 6 hours PRN Dyspepsia  diphenhydrAMINE 50 milliGRAM(s) Oral every 6 hours PRN Extrapyramidal prophylaxis  diphenhydrAMINE Elixir 50 milliGRAM(s) Oral once PRN Extrapyramidal prophylaxis  haloperidol     Tablet 5 milliGRAM(s) Oral every 6 hours PRN moderate psychotic agitation  haloperidol    Injectable 5 milliGRAM(s) IntraMuscular once PRN severe psychotic agitation  hydrOXYzine hydrochloride 50 milliGRAM(s) Oral every 6 hours PRN anxiety  magnesium hydroxide Suspension 30 milliLiter(s) Oral daily PRN Constipation  
MEDICATIONS  (STANDING):  diphenhydrAMINE Injectable 50 milliGRAM(s) IntraMuscular once  divalproex  milliGRAM(s) Oral two times a day  haloperidol     Tablet 10 milliGRAM(s) Oral at bedtime  haloperidol    Injectable 7.5 milliGRAM(s) IntraMuscular once  traZODone 50 milliGRAM(s) Oral at bedtime    MEDICATIONS  (PRN):  acetaminophen     Tablet .. 650 milliGRAM(s) Oral once PRN Temp greater or equal to 38C (100.4F), Mild Pain (1 - 3), Moderate Pain (4 - 6)  aluminum hydroxide/magnesium hydroxide/simethicone Suspension 30 milliLiter(s) Oral every 6 hours PRN Dyspepsia  diphenhydrAMINE 50 milliGRAM(s) Oral every 6 hours PRN Extrapyramidal prophylaxis  diphenhydrAMINE Elixir 50 milliGRAM(s) Oral once PRN Extrapyramidal prophylaxis  haloperidol     Tablet 5 milliGRAM(s) Oral every 6 hours PRN moderate psychotic agitation  haloperidol    Injectable 5 milliGRAM(s) IntraMuscular once PRN severe psychotic agitation  magnesium hydroxide Suspension 30 milliLiter(s) Oral daily PRN Constipation  
MEDICATIONS  (STANDING):  diphenhydrAMINE Injectable 50 milliGRAM(s) IntraMuscular once  haloperidol     Tablet 5 milliGRAM(s) Oral daily  haloperidol     Tablet 10 milliGRAM(s) Oral at bedtime  haloperidol    Injectable 7.5 milliGRAM(s) IntraMuscular once  traZODone 50 milliGRAM(s) Oral at bedtime    MEDICATIONS  (PRN):  acetaminophen     Tablet .. 650 milliGRAM(s) Oral once PRN Temp greater or equal to 38C (100.4F), Mild Pain (1 - 3), Moderate Pain (4 - 6)  aluminum hydroxide/magnesium hydroxide/simethicone Suspension 30 milliLiter(s) Oral every 6 hours PRN Dyspepsia  diphenhydrAMINE 50 milliGRAM(s) Oral every 6 hours PRN Extrapyramidal prophylaxis  diphenhydrAMINE Elixir 50 milliGRAM(s) Oral once PRN Extrapyramidal prophylaxis  haloperidol     Tablet 5 milliGRAM(s) Oral every 6 hours PRN moderate psychotic agitation  haloperidol    Injectable 5 milliGRAM(s) IntraMuscular once PRN severe psychotic agitation  LORazepam     Tablet 2 milliGRAM(s) Oral every 6 hours PRN moderate psychotic agitation  LORazepam   Injectable 2 milliGRAM(s) IntraMuscular once PRN severe psychotic agitation  magnesium hydroxide Suspension 30 milliLiter(s) Oral daily PRN Constipation  
MEDICATIONS  (STANDING):  diphenhydrAMINE Injectable 50 milliGRAM(s) IntraMuscular once  divalproex  milliGRAM(s) Oral two times a day  haloperidol     Tablet 10 milliGRAM(s) Oral at bedtime  haloperidol    Injectable 7.5 milliGRAM(s) IntraMuscular once  traZODone 50 milliGRAM(s) Oral at bedtime    MEDICATIONS  (PRN):  acetaminophen     Tablet .. 650 milliGRAM(s) Oral once PRN Temp greater or equal to 38C (100.4F), Mild Pain (1 - 3), Moderate Pain (4 - 6)  aluminum hydroxide/magnesium hydroxide/simethicone Suspension 30 milliLiter(s) Oral every 6 hours PRN Dyspepsia  diphenhydrAMINE 50 milliGRAM(s) Oral every 6 hours PRN Extrapyramidal prophylaxis  diphenhydrAMINE Elixir 50 milliGRAM(s) Oral once PRN Extrapyramidal prophylaxis  haloperidol     Tablet 5 milliGRAM(s) Oral every 6 hours PRN moderate psychotic agitation  haloperidol    Injectable 5 milliGRAM(s) IntraMuscular once PRN severe psychotic agitation  magnesium hydroxide Suspension 30 milliLiter(s) Oral daily PRN Constipation  
MEDICATIONS  (STANDING):  diphenhydrAMINE Injectable 50 milliGRAM(s) IntraMuscular once  divalproex  milliGRAM(s) Oral at bedtime  haloperidol     Tablet 10 milliGRAM(s) Oral at bedtime  haloperidol    Injectable 7.5 milliGRAM(s) IntraMuscular once  traZODone 50 milliGRAM(s) Oral at bedtime    MEDICATIONS  (PRN):  acetaminophen     Tablet .. 650 milliGRAM(s) Oral once PRN Temp greater or equal to 38C (100.4F), Mild Pain (1 - 3), Moderate Pain (4 - 6)  aluminum hydroxide/magnesium hydroxide/simethicone Suspension 30 milliLiter(s) Oral every 6 hours PRN Dyspepsia  diphenhydrAMINE 50 milliGRAM(s) Oral every 6 hours PRN Extrapyramidal prophylaxis  diphenhydrAMINE Elixir 50 milliGRAM(s) Oral once PRN Extrapyramidal prophylaxis  haloperidol     Tablet 5 milliGRAM(s) Oral every 6 hours PRN moderate psychotic agitation  haloperidol    Injectable 5 milliGRAM(s) IntraMuscular once PRN severe psychotic agitation  LORazepam     Tablet 2 milliGRAM(s) Oral every 6 hours PRN moderate psychotic agitation  LORazepam   Injectable 2 milliGRAM(s) IntraMuscular once PRN severe psychotic agitation  magnesium hydroxide Suspension 30 milliLiter(s) Oral daily PRN Constipation  
MEDICATIONS  (STANDING):  diphenhydrAMINE Injectable 50 milliGRAM(s) IntraMuscular once  divalproex  milliGRAM(s) Oral two times a day  haloperidol     Tablet 10 milliGRAM(s) Oral at bedtime  haloperidol    Injectable 7.5 milliGRAM(s) IntraMuscular once  traZODone 50 milliGRAM(s) Oral at bedtime    MEDICATIONS  (PRN):  acetaminophen     Tablet .. 650 milliGRAM(s) Oral once PRN Temp greater or equal to 38C (100.4F), Mild Pain (1 - 3), Moderate Pain (4 - 6)  aluminum hydroxide/magnesium hydroxide/simethicone Suspension 30 milliLiter(s) Oral every 6 hours PRN Dyspepsia  diphenhydrAMINE 50 milliGRAM(s) Oral every 6 hours PRN Extrapyramidal prophylaxis  diphenhydrAMINE Elixir 50 milliGRAM(s) Oral once PRN Extrapyramidal prophylaxis  haloperidol     Tablet 5 milliGRAM(s) Oral every 6 hours PRN moderate psychotic agitation  haloperidol    Injectable 5 milliGRAM(s) IntraMuscular once PRN severe psychotic agitation  magnesium hydroxide Suspension 30 milliLiter(s) Oral daily PRN Constipation

## 2023-04-14 NOTE — BH INPATIENT PSYCHIATRY PROGRESS NOTE - NSTXDCOTHRDATETRGT_PSY_ALL_CORE
31-Mar-2023
15-Apr-2023
31-Mar-2023
05-Apr-2023
14-Apr-2023
17-Apr-2023
31-Mar-2023
14-Apr-2023
31-Mar-2023
05-Apr-2023
13-Apr-2023
05-Apr-2023
14-Apr-2023
05-Apr-2023

## 2023-04-14 NOTE — BH INPATIENT PSYCHIATRY PROGRESS NOTE - NSTXDISORGGOAL_PSY_ALL_CORE
Will demonstrate purposeful and predictable thoughts/behaviors by making a request

## 2023-04-14 NOTE — BH INPATIENT PSYCHIATRY PROGRESS NOTE - NSBHMSEPERCEPT_PSY_A_CORE
Auditory hallucinations
No abnormalities
Auditory hallucinations
No abnormalities
Auditory hallucinations
No abnormalities
Auditory hallucinations
No abnormalities
No abnormalities
Auditory hallucinations
Auditory hallucinations

## 2023-04-14 NOTE — BH INPATIENT PSYCHIATRY PROGRESS NOTE - NSBHMETABOLIC_PSY_ALL_CORE_FT
BMI: BMI (kg/m2): 27.9 (03-23-23 @ 16:50)  HbA1c:   Glucose:   BP: 137/80 (03-23-23 @ 14:30) (133/83 - 169/112)  Lipid Panel: 
BMI: BMI (kg/m2): 27.9 (03-23-23 @ 16:50)  HbA1c:   Glucose:   BP: --  Lipid Panel: 
BMI: BMI (kg/m2): 27.9 (03-23-23 @ 16:50)  HbA1c:   Glucose:   BP: 137/80 (03-23-23 @ 14:30) (133/83 - 169/112)  Lipid Panel: 
BMI: BMI (kg/m2): 27.9 (03-23-23 @ 16:50)  HbA1c:   Glucose:   BP: --  Lipid Panel: 
BMI: BMI (kg/m2): 27.9 (03-23-23 @ 16:50)  HbA1c:   Glucose:   BP: 137/80 (03-23-23 @ 14:30) (137/80 - 137/80)  Lipid Panel: 
BMI: BMI (kg/m2): 27.9 (03-23-23 @ 16:50)  HbA1c:   Glucose:   BP: --  Lipid Panel:

## 2023-04-14 NOTE — BH INPATIENT PSYCHIATRY PROGRESS NOTE - NSTXDISORGDATETRGT_PSY_ALL_CORE
31-Mar-2023

## 2023-04-14 NOTE — BH DISCHARGE NOTE NURSING/SOCIAL WORK/PSYCH REHAB - NSDPDISTO_PSY_ALL_CORE
Patient will be moving in with his mother, [place], upon discharge at   [address] [place] [address], [place] [address]  Phone: [phone]/Home

## 2023-04-14 NOTE — BH INPATIENT PSYCHIATRY DISCHARGE NOTE - NSDCMRMEDTOKEN_GEN_ALL_CORE_FT
Depakote 250 mg oral delayed release tablet: 1 tab(s) orally 2 times a day  haloperidol 10 mg oral tablet: 1 tab(s) orally once a day (at bedtime)  traZODone 50 mg oral tablet: 1 tab(s) orally once a day (at bedtime)

## 2023-04-14 NOTE — BH INPATIENT PSYCHIATRY PROGRESS NOTE - NSTXDCOTHRDATEEST_PSY_ALL_CORE
24-Mar-2023
05-Apr-2023
05-Apr-2023
07-Apr-2023
07-Apr-2023
06-Apr-2023
24-Mar-2023
24-Mar-2023
05-Apr-2023
05-Apr-2023
07-Apr-2023
05-Apr-2023
13-Apr-2023
05-Apr-2023
24-Mar-2023
14-Apr-2023

## 2023-04-14 NOTE — BH INPATIENT PSYCHIATRY PROGRESS NOTE - NSBHMSETHTPROC_PSY_A_CORE
Disorganized
Linear
Linear
Disorganized
Linear
Disorganized
Linear
Disorganized
Linear
Disorganized
Linear
Disorganized

## 2023-04-14 NOTE — BH INPATIENT PSYCHIATRY DISCHARGE NOTE - REASON FOR ADMISSION
28 yo male, with h/o schizophrenia,AH that were intermittently command type to hurt others. Per reports received, patient’s father was hospitalized due to seizure and patient thougAH that were intermittently command type to hurt others. Per reports received, patient’s father was hospitalized due to seizure and patient thougAH that were intermittently command type to hurt others. Per reports received, patient’s father was hospitalized due to seizure and patient thoug

## 2023-04-14 NOTE — BH INPATIENT PSYCHIATRY PROGRESS NOTE - NSBHMSETHTCONTENT_PSY_A_CORE
Delusions
Unremarkable
Unremarkable
Delusions
Unremarkable
Delusions
Unremarkable
Delusions
Unremarkable
Unremarkable
Delusions
Delusions

## 2023-04-14 NOTE — BH INPATIENT PSYCHIATRY PROGRESS NOTE - PRN MEDS
MEDICATIONS  (PRN):  acetaminophen     Tablet .. 650 milliGRAM(s) Oral once PRN Temp greater or equal to 38C (100.4F), Mild Pain (1 - 3), Moderate Pain (4 - 6)  aluminum hydroxide/magnesium hydroxide/simethicone Suspension 30 milliLiter(s) Oral every 6 hours PRN Dyspepsia  diphenhydrAMINE 50 milliGRAM(s) Oral every 6 hours PRN Extrapyramidal prophylaxis  diphenhydrAMINE Elixir 50 milliGRAM(s) Oral once PRN Extrapyramidal prophylaxis  haloperidol     Tablet 5 milliGRAM(s) Oral every 6 hours PRN moderate psychotic agitation  haloperidol    Injectable 5 milliGRAM(s) IntraMuscular once PRN severe psychotic agitation  LORazepam     Tablet 2 milliGRAM(s) Oral every 6 hours PRN moderate psychotic agitation  LORazepam   Injectable 2 milliGRAM(s) IntraMuscular once PRN severe psychotic agitation  magnesium hydroxide Suspension 30 milliLiter(s) Oral daily PRN Constipation  
MEDICATIONS  (PRN):  acetaminophen     Tablet .. 650 milliGRAM(s) Oral once PRN Temp greater or equal to 38C (100.4F), Mild Pain (1 - 3), Moderate Pain (4 - 6)  aluminum hydroxide/magnesium hydroxide/simethicone Suspension 30 milliLiter(s) Oral every 6 hours PRN Dyspepsia  diphenhydrAMINE 50 milliGRAM(s) Oral every 6 hours PRN Extrapyramidal prophylaxis  diphenhydrAMINE Elixir 50 milliGRAM(s) Oral once PRN Extrapyramidal prophylaxis  haloperidol     Tablet 5 milliGRAM(s) Oral every 6 hours PRN moderate psychotic agitation  haloperidol    Injectable 5 milliGRAM(s) IntraMuscular once PRN severe psychotic agitation  hydrOXYzine hydrochloride 50 milliGRAM(s) Oral every 6 hours PRN anxiety  magnesium hydroxide Suspension 30 milliLiter(s) Oral daily PRN Constipation  
MEDICATIONS  (PRN):  acetaminophen     Tablet .. 650 milliGRAM(s) Oral once PRN Temp greater or equal to 38C (100.4F), Mild Pain (1 - 3), Moderate Pain (4 - 6)  aluminum hydroxide/magnesium hydroxide/simethicone Suspension 30 milliLiter(s) Oral every 6 hours PRN Dyspepsia  diphenhydrAMINE 50 milliGRAM(s) Oral every 6 hours PRN Extrapyramidal prophylaxis  diphenhydrAMINE Elixir 50 milliGRAM(s) Oral once PRN Extrapyramidal prophylaxis  haloperidol     Tablet 5 milliGRAM(s) Oral every 6 hours PRN moderate psychotic agitation  haloperidol    Injectable 5 milliGRAM(s) IntraMuscular once PRN severe psychotic agitation  magnesium hydroxide Suspension 30 milliLiter(s) Oral daily PRN Constipation  
MEDICATIONS  (PRN):  acetaminophen     Tablet .. 650 milliGRAM(s) Oral once PRN Temp greater or equal to 38C (100.4F), Mild Pain (1 - 3), Moderate Pain (4 - 6)  aluminum hydroxide/magnesium hydroxide/simethicone Suspension 30 milliLiter(s) Oral every 6 hours PRN Dyspepsia  diphenhydrAMINE 50 milliGRAM(s) Oral every 6 hours PRN Extrapyramidal prophylaxis  diphenhydrAMINE Elixir 50 milliGRAM(s) Oral once PRN Extrapyramidal prophylaxis  haloperidol     Tablet 5 milliGRAM(s) Oral every 6 hours PRN moderate psychotic agitation  haloperidol    Injectable 5 milliGRAM(s) IntraMuscular once PRN severe psychotic agitation  magnesium hydroxide Suspension 30 milliLiter(s) Oral daily PRN Constipation  
MEDICATIONS  (PRN):  acetaminophen     Tablet .. 650 milliGRAM(s) Oral once PRN Temp greater or equal to 38C (100.4F), Mild Pain (1 - 3), Moderate Pain (4 - 6)  aluminum hydroxide/magnesium hydroxide/simethicone Suspension 30 milliLiter(s) Oral every 6 hours PRN Dyspepsia  diphenhydrAMINE 50 milliGRAM(s) Oral every 6 hours PRN Extrapyramidal prophylaxis  diphenhydrAMINE Elixir 50 milliGRAM(s) Oral once PRN Extrapyramidal prophylaxis  haloperidol     Tablet 5 milliGRAM(s) Oral every 6 hours PRN moderate psychotic agitation  haloperidol    Injectable 5 milliGRAM(s) IntraMuscular once PRN severe psychotic agitation  LORazepam     Tablet 2 milliGRAM(s) Oral every 6 hours PRN moderate psychotic agitation  LORazepam   Injectable 2 milliGRAM(s) IntraMuscular once PRN severe psychotic agitation  magnesium hydroxide Suspension 30 milliLiter(s) Oral daily PRN Constipation  
MEDICATIONS  (PRN):  acetaminophen     Tablet .. 650 milliGRAM(s) Oral once PRN Temp greater or equal to 38C (100.4F), Mild Pain (1 - 3), Moderate Pain (4 - 6)  aluminum hydroxide/magnesium hydroxide/simethicone Suspension 30 milliLiter(s) Oral every 6 hours PRN Dyspepsia  diphenhydrAMINE 50 milliGRAM(s) Oral every 6 hours PRN Extrapyramidal prophylaxis  diphenhydrAMINE Elixir 50 milliGRAM(s) Oral once PRN Extrapyramidal prophylaxis  haloperidol     Tablet 5 milliGRAM(s) Oral every 6 hours PRN moderate psychotic agitation  haloperidol    Injectable 5 milliGRAM(s) IntraMuscular once PRN severe psychotic agitation  hydrOXYzine hydrochloride 50 milliGRAM(s) Oral every 6 hours PRN anxiety  magnesium hydroxide Suspension 30 milliLiter(s) Oral daily PRN Constipation  
MEDICATIONS  (PRN):  diphenhydrAMINE 50 milliGRAM(s) Oral every 6 hours PRN Extrapyramidal prophylaxis  diphenhydrAMINE Elixir 50 milliGRAM(s) Oral once PRN Extrapyramidal prophylaxis  
MEDICATIONS  (PRN):  acetaminophen     Tablet .. 650 milliGRAM(s) Oral once PRN Temp greater or equal to 38C (100.4F), Mild Pain (1 - 3), Moderate Pain (4 - 6)  aluminum hydroxide/magnesium hydroxide/simethicone Suspension 30 milliLiter(s) Oral every 6 hours PRN Dyspepsia  diphenhydrAMINE 50 milliGRAM(s) Oral every 6 hours PRN Extrapyramidal prophylaxis  diphenhydrAMINE Elixir 50 milliGRAM(s) Oral once PRN Extrapyramidal prophylaxis  haloperidol     Tablet 5 milliGRAM(s) Oral every 6 hours PRN moderate psychotic agitation  haloperidol    Injectable 5 milliGRAM(s) IntraMuscular once PRN severe psychotic agitation  hydrOXYzine hydrochloride 50 milliGRAM(s) Oral every 6 hours PRN anxiety  magnesium hydroxide Suspension 30 milliLiter(s) Oral daily PRN Constipation  
MEDICATIONS  (PRN):  acetaminophen     Tablet .. 650 milliGRAM(s) Oral once PRN Temp greater or equal to 38C (100.4F), Mild Pain (1 - 3), Moderate Pain (4 - 6)  aluminum hydroxide/magnesium hydroxide/simethicone Suspension 30 milliLiter(s) Oral every 6 hours PRN Dyspepsia  diphenhydrAMINE 50 milliGRAM(s) Oral every 6 hours PRN Extrapyramidal prophylaxis  diphenhydrAMINE Elixir 50 milliGRAM(s) Oral once PRN Extrapyramidal prophylaxis  haloperidol     Tablet 5 milliGRAM(s) Oral every 6 hours PRN moderate psychotic agitation  haloperidol    Injectable 5 milliGRAM(s) IntraMuscular once PRN severe psychotic agitation  magnesium hydroxide Suspension 30 milliLiter(s) Oral daily PRN Constipation  
MEDICATIONS  (PRN):  acetaminophen     Tablet .. 650 milliGRAM(s) Oral once PRN Temp greater or equal to 38C (100.4F), Mild Pain (1 - 3), Moderate Pain (4 - 6)  aluminum hydroxide/magnesium hydroxide/simethicone Suspension 30 milliLiter(s) Oral every 6 hours PRN Dyspepsia  diphenhydrAMINE 50 milliGRAM(s) Oral every 6 hours PRN Extrapyramidal prophylaxis  diphenhydrAMINE Elixir 50 milliGRAM(s) Oral once PRN Extrapyramidal prophylaxis  haloperidol     Tablet 5 milliGRAM(s) Oral every 6 hours PRN moderate psychotic agitation  haloperidol    Injectable 5 milliGRAM(s) IntraMuscular once PRN severe psychotic agitation  LORazepam     Tablet 2 milliGRAM(s) Oral every 6 hours PRN moderate psychotic agitation  LORazepam   Injectable 2 milliGRAM(s) IntraMuscular once PRN severe psychotic agitation  magnesium hydroxide Suspension 30 milliLiter(s) Oral daily PRN Constipation  
MEDICATIONS  (PRN):  acetaminophen     Tablet .. 650 milliGRAM(s) Oral once PRN Temp greater or equal to 38C (100.4F), Mild Pain (1 - 3), Moderate Pain (4 - 6)  aluminum hydroxide/magnesium hydroxide/simethicone Suspension 30 milliLiter(s) Oral every 6 hours PRN Dyspepsia  diphenhydrAMINE 50 milliGRAM(s) Oral every 6 hours PRN Extrapyramidal prophylaxis  diphenhydrAMINE Elixir 50 milliGRAM(s) Oral once PRN Extrapyramidal prophylaxis  haloperidol     Tablet 5 milliGRAM(s) Oral every 6 hours PRN moderate psychotic agitation  haloperidol    Injectable 5 milliGRAM(s) IntraMuscular once PRN severe psychotic agitation  magnesium hydroxide Suspension 30 milliLiter(s) Oral daily PRN Constipation  
MEDICATIONS  (PRN):  acetaminophen     Tablet .. 650 milliGRAM(s) Oral once PRN Temp greater or equal to 38C (100.4F), Mild Pain (1 - 3), Moderate Pain (4 - 6)  aluminum hydroxide/magnesium hydroxide/simethicone Suspension 30 milliLiter(s) Oral every 6 hours PRN Dyspepsia  diphenhydrAMINE 50 milliGRAM(s) Oral every 6 hours PRN Extrapyramidal prophylaxis  diphenhydrAMINE Elixir 50 milliGRAM(s) Oral once PRN Extrapyramidal prophylaxis  haloperidol     Tablet 5 milliGRAM(s) Oral every 6 hours PRN moderate psychotic agitation  haloperidol    Injectable 5 milliGRAM(s) IntraMuscular once PRN severe psychotic agitation  LORazepam     Tablet 2 milliGRAM(s) Oral every 6 hours PRN moderate psychotic agitation  LORazepam   Injectable 2 milliGRAM(s) IntraMuscular once PRN severe psychotic agitation  magnesium hydroxide Suspension 30 milliLiter(s) Oral daily PRN Constipation  
MEDICATIONS  (PRN):  acetaminophen     Tablet .. 650 milliGRAM(s) Oral once PRN Temp greater or equal to 38C (100.4F), Mild Pain (1 - 3), Moderate Pain (4 - 6)  aluminum hydroxide/magnesium hydroxide/simethicone Suspension 30 milliLiter(s) Oral every 6 hours PRN Dyspepsia  diphenhydrAMINE 50 milliGRAM(s) Oral every 6 hours PRN Extrapyramidal prophylaxis  diphenhydrAMINE Elixir 50 milliGRAM(s) Oral once PRN Extrapyramidal prophylaxis  haloperidol     Tablet 5 milliGRAM(s) Oral every 6 hours PRN moderate psychotic agitation  haloperidol    Injectable 5 milliGRAM(s) IntraMuscular once PRN severe psychotic agitation  LORazepam     Tablet 2 milliGRAM(s) Oral every 6 hours PRN moderate psychotic agitation  LORazepam   Injectable 2 milliGRAM(s) IntraMuscular once PRN severe psychotic agitation  magnesium hydroxide Suspension 30 milliLiter(s) Oral daily PRN Constipation  
MEDICATIONS  (PRN):  acetaminophen     Tablet .. 650 milliGRAM(s) Oral once PRN Temp greater or equal to 38C (100.4F), Mild Pain (1 - 3), Moderate Pain (4 - 6)  aluminum hydroxide/magnesium hydroxide/simethicone Suspension 30 milliLiter(s) Oral every 6 hours PRN Dyspepsia  diphenhydrAMINE 50 milliGRAM(s) Oral every 6 hours PRN Extrapyramidal prophylaxis  diphenhydrAMINE Elixir 50 milliGRAM(s) Oral once PRN Extrapyramidal prophylaxis  haloperidol     Tablet 5 milliGRAM(s) Oral every 6 hours PRN moderate psychotic agitation  haloperidol    Injectable 5 milliGRAM(s) IntraMuscular once PRN severe psychotic agitation  magnesium hydroxide Suspension 30 milliLiter(s) Oral daily PRN Constipation  
MEDICATIONS  (PRN):  acetaminophen     Tablet .. 650 milliGRAM(s) Oral once PRN Temp greater or equal to 38C (100.4F), Mild Pain (1 - 3), Moderate Pain (4 - 6)  aluminum hydroxide/magnesium hydroxide/simethicone Suspension 30 milliLiter(s) Oral every 6 hours PRN Dyspepsia  diphenhydrAMINE 50 milliGRAM(s) Oral every 6 hours PRN Extrapyramidal prophylaxis  diphenhydrAMINE Elixir 50 milliGRAM(s) Oral once PRN Extrapyramidal prophylaxis  haloperidol     Tablet 5 milliGRAM(s) Oral every 6 hours PRN moderate psychotic agitation  haloperidol    Injectable 5 milliGRAM(s) IntraMuscular once PRN severe psychotic agitation  magnesium hydroxide Suspension 30 milliLiter(s) Oral daily PRN Constipation  
MEDICATIONS  (PRN):  acetaminophen     Tablet .. 650 milliGRAM(s) Oral once PRN Temp greater or equal to 38C (100.4F), Mild Pain (1 - 3), Moderate Pain (4 - 6)  aluminum hydroxide/magnesium hydroxide/simethicone Suspension 30 milliLiter(s) Oral every 6 hours PRN Dyspepsia  diphenhydrAMINE 50 milliGRAM(s) Oral every 6 hours PRN Extrapyramidal prophylaxis  diphenhydrAMINE Elixir 50 milliGRAM(s) Oral once PRN Extrapyramidal prophylaxis  haloperidol     Tablet 5 milliGRAM(s) Oral every 6 hours PRN moderate psychotic agitation  haloperidol    Injectable 5 milliGRAM(s) IntraMuscular once PRN severe psychotic agitation  LORazepam     Tablet 2 milliGRAM(s) Oral every 6 hours PRN moderate psychotic agitation  LORazepam   Injectable 2 milliGRAM(s) IntraMuscular once PRN severe psychotic agitation  magnesium hydroxide Suspension 30 milliLiter(s) Oral daily PRN Constipation

## 2023-04-14 NOTE — BH INPATIENT PSYCHIATRY PROGRESS NOTE - NSTXDCOTHRGOAL_PSY_ALL_CORE
Patient will be referred to the appropriate level of outpatient treatment and have appointments within 3-5 days of discharge.  Patient will be discharged to safe housing either back home or DSS emergency housing.   will explore need for duel diagnosis tx with appointments if needed.  Benefits in place

## 2023-04-14 NOTE — BH INPATIENT PSYCHIATRY PROGRESS NOTE - NSTXDCOTHRPROGRES_PSY_ALL_CORE
No Change
Improving
No Change
Improving
No Change
Met - goal discontinued

## 2023-04-14 NOTE — BH INPATIENT PSYCHIATRY PROGRESS NOTE - NSTXPSYCHOGOAL_PSY_ALL_CORE
Will engage in a 15 minute conversation with no irrational content

## 2023-04-14 NOTE — BH INPATIENT PSYCHIATRY PROGRESS NOTE - NSDCCRITERIA_PSY_ALL_CORE
pt with stable mood and affect and without and hallucination

## 2023-04-14 NOTE — BH INPATIENT PSYCHIATRY PROGRESS NOTE - NSICDXBHPRIMARYDX_PSY_ALL_CORE
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  

## 2023-04-14 NOTE — BH INPATIENT PSYCHIATRY PROGRESS NOTE - NSBHMSERECMEM_PSY_A_CORE
Impaired
Normal
Impaired
Normal
Impaired
Normal
Impaired
Normal
Impaired
Normal

## 2023-04-14 NOTE — BH INPATIENT PSYCHIATRY PROGRESS NOTE - NSBHMSEAFFRANGE_PSY_A_CORE
Full
Labile
Labile
Full/Labile
Full
Labile
Full
Labile
Full/Labile
Full/Labile

## 2023-04-14 NOTE — BH INPATIENT PSYCHIATRY PROGRESS NOTE - NSTXPSYCHOINTERMD_PSY_ALL_CORE
pt on haldol

## 2023-04-14 NOTE — BH DISCHARGE NOTE NURSING/SOCIAL WORK/PSYCH REHAB - NSCDUDCCRISIS_PSY_A_CORE
.  [facility], Adults and Families  [address] [place] [address]  Mobile Crisis Hotline – (821) 907-4761/.National Suicide Prevention Lifeline 8 (816) 258-7002/.  [facility]  1 (214) LIFENET (688-8786)/.  [facility]  (580) 206-8473/.  [facility] Response Crisis Hotline  (727) 586-2666  24 hour telephone crisis intervention and suicide prevention hotline concerned with all mental health issues/.  [facility]  [address] [place] [address] [phone]   Hours:  Monday through Friday from 9 AM to 3 PM/Other.../988 Suicide and Crisis Lifeline

## 2023-04-14 NOTE — BH INPATIENT PSYCHIATRY PROGRESS NOTE - NSBHMSEKNOWHOW_PSY_ALL_CORE
Current Events

## 2023-04-14 NOTE — BH INPATIENT PSYCHIATRY PROGRESS NOTE - NSBHMSEATTEN_PSY_A_CORE
Normal
Impaired
Normal
Impaired
Impaired
Normal

## 2023-04-14 NOTE — BH INPATIENT PSYCHIATRY PROGRESS NOTE - NSBHASSESSSUMMFT_PSY_ALL_CORE
pt with need for aftercare planning as he is with no place to live 
Pt remaining floridly psychotic .  Pt denies suicidal ideation but not answering if any homicidal ideation and if so towards whom. Reportedly he voiced homicidal thoughts to mother but not able to reach her today. .  Pt is paranoid , secretive, easily irritable , is impulsive and can be intrusive Pt started on haldol . 
Pt with little insight .  Pt with isolation and only willing to tolerate smal periods of group then asking to leave
pt with willing to continue with medication .  Still working with social wor concerning aftercare planninf and where he is to be residing. 
Pt with continued blunted constricted affect Pt with limited insight
pt with denial of any suicidal ideatio intent or plan 
Pt with improved goal directed speech     1. continuing to deny any  suicidal thought,behavior,plan  2. Dynamic(modifiable, treatable) risk factors/ triggers, and any change in them  3. Protective factors( internal and external) and any change in them  4. low level of risk of suicided behavior on the unit and mitigation plan as pt willing to take medication  .  
Pt wth denial of any suicidal or homicidal ideation intent or plan Pt with denial of any side effects from mediation Pt declining the need for any injectable form of his medication 
Pt with denial of any hallucination . He is with euthymic mood and affect and is denying any suicidal ideation, intent or plan 
Pt with application for [facility] housing .  Family still supportive but has informed the pt that they do not want the pt to return to live with them 
Pt with limited insight. Pt with increased goal directed speech. He denies any suicidal ideation intent 
Pt with improved goal directed speech.  Pt still with blunted cnsricted affect.  Pt with no place to live and lacking in family  support.
Working with the pt who indicated that he had alternative place to stay but this is not workable his sister is unable to accommodate him . Pt seems unable or unwilling to accept that his family is reluctant to have him return to live with them .

## 2023-04-14 NOTE — BH INPATIENT PSYCHIATRY PROGRESS NOTE - NSBHATTESTBILLING_PSY_A_CORE
Billing in another system
[id number] OBS or IP - moderate complexity OR 35-49 mins
Billing in another system
[id number] OBS or IP - moderate complexity OR 35-49 mins
Billing in another system

## 2023-04-14 NOTE — BH INPATIENT PSYCHIATRY DISCHARGE NOTE - HOSPITAL COURSE
Pt with decreased anxiety and decreased preoccupation and isolation Pt with decreased anxiety and decreased preoccupation and isolation . Pt with increased goal directed speech , improved mood and affect stability. Pt denies any side effect from medication .  Educated the pt about the use of medication , potential side effects , potential benefits from conversion to injectable form of medication , but pt declined the need for haldol decanoate.  Pt did verbalize willing to continue with medication treatment and to attend aftercare treatment

## 2023-04-14 NOTE — BH DISCHARGE NOTE NURSING/SOCIAL WORK/PSYCH REHAB - NSBHDCAGENCY1FT_PSY_A_CORE
[employer] [facility] - Dir. Monet Chávez                                                                    Therapist, Miguel Angel Parson

## 2023-04-14 NOTE — BH INPATIENT PSYCHIATRY PROGRESS NOTE - NSTXDISORGINTERMD_PSY_ALL_CORE
pt on haldol for delusions

## 2023-04-14 NOTE — BH INPATIENT PSYCHIATRY PROGRESS NOTE - NSTXDISORGDATEEST_PSY_ALL_CORE
24-Mar-2023

## 2023-04-14 NOTE — BH INPATIENT PSYCHIATRY PROGRESS NOTE - NSBHMSEGAIT_PSY_A_CORE
Unable to assess

## 2023-04-14 NOTE — BH INPATIENT PSYCHIATRY DISCHARGE NOTE - NSDCCPCAREPLAN_GEN_ALL_CORE_FT
PRINCIPAL DISCHARGE DIAGNOSIS  Diagnosis: Disorganized schizophrenia  Assessment and Plan of Treatment:

## 2023-04-14 NOTE — BH INPATIENT PSYCHIATRY PROGRESS NOTE - NSTXCOPEINTERMD_PSY_ALL_CORE
encouraged pt to attend groups for coping skills

## 2023-04-14 NOTE — BH INPATIENT PSYCHIATRY PROGRESS NOTE - NSBHMSEAFFQUAL_PSY_A_CORE
Elevated
Elevated
Euthymic
Euthymic/Elevated
Elevated
Euthymic/Elevated
Elevated
Euthymic
Elevated
Euthymic/Elevated
Elevated

## 2023-04-14 NOTE — BH INPATIENT PSYCHIATRY PROGRESS NOTE - NSBHCHARTREVIEWVS_PSY_A_CORE FT
Vital Signs Last 24 Hrs  T(C): 36.7 (04-03-23 @ 07:41), Max: 36.7 (04-03-23 @ 07:41)  T(F): 98.1 (04-03-23 @ 07:41), Max: 98.1 (04-03-23 @ 07:41)  HR: --  BP: --  BP(mean): --  RR: 16 (04-03-23 @ 07:41) (16 - 16)  SpO2: 100% (04-03-23 @ 07:41) (100% - 100%)    Orthostatic VS  04-03-23 @ 07:41  Lying BP: --/-- HR: --  Sitting BP: 141/78 HR: 97  Standing BP: 146/76 HR: 100  Site: upper right arm  Mode: electronic  Orthostatic VS  04-02-23 @ 07:31  Lying BP: --/-- HR: --  Sitting BP: 136/79 HR: 87  Standing BP: 146/71 HR: 98  Site: upper right arm  Mode: electronic  
Vital Signs Last 24 Hrs  T(C): 36.5 (04-05-23 @ 07:45), Max: 36.5 (04-05-23 @ 07:45)  T(F): 97.7 (04-05-23 @ 07:45), Max: 97.7 (04-05-23 @ 07:45)  HR: --  BP: --  BP(mean): --  RR: 16 (04-05-23 @ 07:45) (16 - 16)  SpO2: 98% (04-05-23 @ 07:45) (98% - 98%)    Orthostatic VS  04-05-23 @ 07:45  Lying BP: --/-- HR: --  Sitting BP: 139/87 HR: 82  Standing BP: 128/76 HR: 99  Site: upper right arm  Mode: electronic  Orthostatic VS  04-04-23 @ 07:31  Lying BP: --/-- HR: --  Sitting BP: 145/86 HR: 94  Standing BP: 143/89 HR: 100  Site: upper right arm  Mode: electronic  
Vital Signs Last 24 Hrs  T(C): 36.6 (04-10-23 @ 08:18), Max: 36.6 (04-10-23 @ 08:18)  T(F): 97.9 (04-10-23 @ 08:18), Max: 97.9 (04-10-23 @ 08:18)  HR: --  BP: --  BP(mean): --  RR: 18 (04-10-23 @ 08:18) (18 - 18)  SpO2: 100% (04-10-23 @ 08:18) (100% - 100%)    Orthostatic VS  04-10-23 @ 08:18  Lying BP: --/-- HR: --  Sitting BP: 140/83 HR: 98  Standing BP: 148/90 HR: 102  Site: upper right arm  Mode: electronic  Orthostatic VS  04-09-23 @ 07:04  Lying BP: --/-- HR: --  Sitting BP: 129/84 HR: 75  Standing BP: 131/72 HR: 97  Site: upper right arm  Mode: electronic  
Vital Signs Last 24 Hrs  T(C): 36.6 (04-14-23 @ 07:35), Max: 36.6 (04-14-23 @ 07:35)  T(F): 97.9 (04-14-23 @ 07:35), Max: 97.9 (04-14-23 @ 07:35)  HR: --  BP: --  BP(mean): --  RR: 16 (04-14-23 @ 07:35) (16 - 16)  SpO2: 100% (04-14-23 @ 07:35) (100% - 100%)    Orthostatic VS  04-14-23 @ 07:35  Lying BP: --/-- HR: --  Sitting BP: 124/81 HR: 93  Standing BP: 131/65 HR: 100  Site: upper right arm  Mode: electronic  Orthostatic VS  04-13-23 @ 07:26  Lying BP: --/-- HR: --  Sitting BP: 147/84 HR: 100  Standing BP: 147/75 HR: 100  Site: upper right arm  Mode: electronic  
Vital Signs Last 24 Hrs  T(C): 36.7 (04-13-23 @ 07:26), Max: 36.7 (04-13-23 @ 07:26)  T(F): 98.1 (04-13-23 @ 07:26), Max: 98.1 (04-13-23 @ 07:26)  HR: --  BP: --  BP(mean): --  RR: 16 (04-13-23 @ 07:26) (16 - 16)  SpO2: 100% (04-13-23 @ 07:26) (100% - 100%)    Orthostatic VS  04-13-23 @ 07:26  Lying BP: --/-- HR: --  Sitting BP: 147/84 HR: 100  Standing BP: 147/75 HR: 100  Site: upper right arm  Mode: electronic  Orthostatic VS  04-12-23 @ 07:24  Lying BP: --/-- HR: --  Sitting BP: 151/89 HR: 97  Standing BP: 166/90 HR: 107  Site: upper right arm  Mode: electronic  
Vital Signs Last 24 Hrs  T(C): 36.8 (04-07-23 @ 07:43), Max: 36.8 (04-07-23 @ 07:43)  T(F): 98.2 (04-07-23 @ 07:43), Max: 98.2 (04-07-23 @ 07:43)  HR: --  BP: --  BP(mean): --  RR: 16 (04-07-23 @ 07:43) (16 - 16)  SpO2: 100% (04-07-23 @ 07:43) (100% - 100%)    Orthostatic VS  04-07-23 @ 07:43  Lying BP: --/-- HR: --  Sitting BP: 130/88 HR: 97  Standing BP: 132/77 HR: 100  Site: upper right arm  Mode: electronic  Orthostatic VS  04-06-23 @ 07:52  Lying BP: --/-- HR: --  Sitting BP: 142/67 HR: 97  Standing BP: 140/68 HR: 100  Site: upper right arm  Mode: electronic  
Vital Signs Last 24 Hrs  T(C): 36.6 (03-31-23 @ 07:56), Max: 36.6 (03-31-23 @ 07:56)  T(F): 97.8 (03-31-23 @ 07:56), Max: 97.8 (03-31-23 @ 07:56)  HR: --  BP: --  BP(mean): --  RR: 18 (03-31-23 @ 07:56) (18 - 18)  SpO2: 100% (03-31-23 @ 07:56) (100% - 100%)    Orthostatic VS  03-31-23 @ 07:56  Lying BP: --/-- HR: --  Sitting BP: 127/82 HR: 90  Standing BP: 126/78 HR: 100  Site: upper right arm  Mode: electronic  Orthostatic VS  03-30-23 @ 07:30  Lying BP: --/-- HR: --  Sitting BP: 157/88 HR: 91  Standing BP: 142/79 HR: 101  Site: upper right arm  Mode: electronic  
Vital Signs Last 24 Hrs  T(C): 36.7 (04-06-23 @ 07:52), Max: 36.7 (04-06-23 @ 07:52)  T(F): 98 (04-06-23 @ 07:52), Max: 98 (04-06-23 @ 07:52)  HR: --  BP: --  BP(mean): --  RR: 16 (04-06-23 @ 07:52) (16 - 16)  SpO2: 100% (04-06-23 @ 07:52) (100% - 100%)    Orthostatic VS  04-06-23 @ 07:52  Lying BP: --/-- HR: --  Sitting BP: 142/67 HR: 97  Standing BP: 140/68 HR: 100  Site: upper right arm  Mode: electronic  Orthostatic VS  04-05-23 @ 07:45  Lying BP: --/-- HR: --  Sitting BP: 139/87 HR: 82  Standing BP: 128/76 HR: 99  Site: upper right arm  Mode: electronic  
Vital Signs Last 24 Hrs  T(C): 36.9 (03-26-23 @ 08:11), Max: 36.9 (03-26-23 @ 08:11)  T(F): 98.4 (03-26-23 @ 08:11), Max: 98.4 (03-26-23 @ 08:11)  HR: --  BP: --  BP(mean): --  RR: 15 (03-26-23 @ 08:11) (15 - 15)  SpO2: 98% (03-26-23 @ 08:11) (98% - 98%)    Orthostatic VS  03-26-23 @ 08:11  Lying BP: --/-- HR: --  Sitting BP: 140/82 HR: 107  Standing BP: 136/82 HR: 111  Site: upper right arm  Mode: electronic  Orthostatic VS  03-25-23 @ 08:36  Lying BP: --/-- HR: --  Sitting BP: 147/82 HR: 98  Standing BP: 156/85 HR: 102  Site: upper right arm  Mode: electronic  
Vital Signs Last 24 Hrs  T(C): 36.7 (04-04-23 @ 07:31), Max: 36.7 (04-04-23 @ 07:31)  T(F): 98 (04-04-23 @ 07:31), Max: 98 (04-04-23 @ 07:31)  HR: --  BP: --  BP(mean): --  RR: 16 (04-04-23 @ 07:31) (16 - 16)  SpO2: 100% (04-04-23 @ 07:31) (100% - 100%)    Orthostatic VS  04-04-23 @ 07:31  Lying BP: --/-- HR: --  Sitting BP: 145/86 HR: 94  Standing BP: 143/89 HR: 100  Site: upper right arm  Mode: electronic  Orthostatic VS  04-03-23 @ 07:41  Lying BP: --/-- HR: --  Sitting BP: 141/78 HR: 97  Standing BP: 146/76 HR: 100  Site: upper right arm  Mode: electronic  
Vital Signs Last 24 Hrs  T(C): 36.7 (03-28-23 @ 07:16), Max: 36.7 (03-28-23 @ 07:16)  T(F): 98 (03-28-23 @ 07:16), Max: 98 (03-28-23 @ 07:16)  HR: --  BP: --  BP(mean): --  RR: 16 (03-28-23 @ 07:16) (16 - 16)  SpO2: 99% (03-28-23 @ 07:16) (99% - 99%)    Orthostatic VS  03-28-23 @ 07:16  Lying BP: --/-- HR: --  Sitting BP: 126/78 HR: 83  Standing BP: 124/77 HR: 92  Site: upper right arm  Mode: electronic  Orthostatic VS  03-27-23 @ 07:34  Lying BP: --/-- HR: --  Sitting BP: 136/84 HR: 92  Standing BP: 147/87 HR: 102  Site: upper right arm  Mode: electronic  
Vital Signs Last 24 Hrs  T(C): 36.9 (03-25-23 @ 08:36), Max: 36.9 (03-25-23 @ 08:36)  T(F): 98.5 (03-25-23 @ 08:36), Max: 98.5 (03-25-23 @ 08:36)  HR: --  BP: --  BP(mean): --  RR: 15 (03-25-23 @ 08:36) (15 - 15)  SpO2: 98% (03-25-23 @ 08:36) (98% - 98%)    Orthostatic VS  03-25-23 @ 08:36  Lying BP: --/-- HR: --  Sitting BP: 147/82 HR: 98  Standing BP: 156/85 HR: 102  Site: upper right arm  Mode: electronic  Orthostatic VS  03-24-23 @ 07:12  Lying BP: --/-- HR: --  Sitting BP: 152/84 HR: 90  Standing BP: 154/73 HR: 100  Site: upper right arm  Mode: electronic  Orthostatic VS  03-23-23 @ 16:50  Lying BP: --/-- HR: --  Sitting BP: 123/81 HR: 88  Standing BP: 123/73 HR: 111  Site: --  Mode: --  
Vital Signs Last 24 Hrs  T(C): 36.4 (04-11-23 @ 07:15), Max: 36.4 (04-11-23 @ 07:15)  T(F): 97.6 (04-11-23 @ 07:15), Max: 97.6 (04-11-23 @ 07:15)  HR: --  BP: --  BP(mean): --  RR: 16 (04-11-23 @ 07:15) (16 - 16)  SpO2: 97% (04-11-23 @ 07:15) (97% - 97%)    Orthostatic VS  04-11-23 @ 07:15  Lying BP: --/-- HR: --  Sitting BP: 144/90 HR: 92  Standing BP: 144/79 HR: 104  Site: upper right arm  Mode: electronic  Orthostatic VS  04-10-23 @ 08:18  Lying BP: --/-- HR: --  Sitting BP: 140/83 HR: 98  Standing BP: 148/90 HR: 102  Site: upper right arm  Mode: electronic  
Vital Signs Last 24 Hrs  T(C): 36.6 (03-29-23 @ 08:36), Max: 36.6 (03-29-23 @ 08:36)  T(F): 97.8 (03-29-23 @ 08:36), Max: 97.8 (03-29-23 @ 08:36)  HR: --  BP: --  BP(mean): --  RR: 18 (03-29-23 @ 08:36) (18 - 18)  SpO2: --    Orthostatic VS  03-29-23 @ 08:36  Lying BP: --/-- HR: --  Sitting BP: 136/81 HR: 98  Standing BP: 141/80 HR: 114  Site: --  Mode: --  Orthostatic VS  03-28-23 @ 07:16  Lying BP: --/-- HR: --  Sitting BP: 126/78 HR: 83  Standing BP: 124/77 HR: 92  Site: upper right arm  Mode: electronic  
Vital Signs Last 24 Hrs  T(C): 37.1 (03-24-23 @ 07:12), Max: 37.1 (03-24-23 @ 07:12)  T(F): 98.7 (03-24-23 @ 07:12), Max: 98.7 (03-24-23 @ 07:12)  HR: --  BP: --  BP(mean): --  RR: 18 (03-24-23 @ 07:12) (18 - 18)  SpO2: 99% (03-24-23 @ 07:12) (99% - 99%)    Orthostatic VS  03-24-23 @ 07:12  Lying BP: --/-- HR: --  Sitting BP: 152/84 HR: 90  Standing BP: 154/73 HR: 100  Site: upper right arm  Mode: electronic  Orthostatic VS  03-23-23 @ 16:50  Lying BP: --/-- HR: --  Sitting BP: 123/81 HR: 88  Standing BP: 123/73 HR: 111  Site: --  Mode: --  
Vital Signs Last 24 Hrs  T(C): 36.7 (03-30-23 @ 07:30), Max: 36.7 (03-30-23 @ 07:30)  T(F): 98 (03-30-23 @ 07:30), Max: 98 (03-30-23 @ 07:30)  HR: --  BP: --  BP(mean): --  RR: 16 (03-30-23 @ 07:30) (16 - 16)  SpO2: 98% (03-30-23 @ 07:30) (98% - 98%)    Orthostatic VS  03-30-23 @ 07:30  Lying BP: --/-- HR: --  Sitting BP: 157/88 HR: 91  Standing BP: 142/79 HR: 101  Site: upper right arm  Mode: electronic  Orthostatic VS  03-29-23 @ 08:36  Lying BP: --/-- HR: --  Sitting BP: 136/81 HR: 98  Standing BP: 141/80 HR: 114  Site: --  Mode: --

## 2023-04-14 NOTE — BH INPATIENT PSYCHIATRY PROGRESS NOTE - NSBHMSEBEHAV_PSY_A_CORE
Uncooperative
Cooperative/Uncooperative
Uncooperative
Cooperative/Uncooperative
Uncooperative
Uncooperative
Cooperative/Uncooperative
Uncooperative
Cooperative/Uncooperative
Cooperative/Uncooperative

## 2023-04-14 NOTE — BH INPATIENT PSYCHIATRY PROGRESS NOTE - NSBHMSEKNOW_PSY_A_CORE
Impaired
Impaired
Normal
Impaired
Impaired
Normal
Impaired
Normal
Impaired
Normal
Normal
Impaired

## 2023-04-14 NOTE — BH DISCHARGE NOTE NURSING/SOCIAL WORK/PSYCH REHAB - PATIENT PORTAL LINK FT
You can access the FollowMyHealth Patient Portal offered by [facility] by registering at the following website: [email or web address]. By joining [facility]’s FollowMyHealth portal, you will also be able to view your health information using other applications (apps) compatible with our system.

## 2023-04-14 NOTE — BH DISCHARGE NOTE NURSING/SOCIAL WORK/PSYCH REHAB - NSDCADDINFO1FT_PSY_ALL_CORE
Patient has an appointment to resume treatment at [facility] on Tues. April 18, 2023 at 9:00AM, first group is at 10:00AM.    *[facility] is aware of your new address and has arranged transportation from your mom's address.

## 2023-04-14 NOTE — BH INPATIENT PSYCHIATRY PROGRESS NOTE - NSBHMSETHTASSOC_PSY_A_CORE
Normal
Loose
Normal
Loose
Normal
Loose
Loose
Normal

## 2023-04-14 NOTE — BH INPATIENT PSYCHIATRY PROGRESS NOTE - NSBHMSELANG_PSY_A_CORE
Impaired naming/Impaired repetition
No abnormalities noted/Impaired naming/Impaired repetition
Impaired naming/Impaired repetition
No abnormalities noted/Impaired naming/Impaired repetition
Impaired naming/Impaired repetition
Impaired naming/Impaired repetition
No abnormalities noted/Impaired naming/Impaired repetition
Impaired naming/Impaired repetition
No abnormalities noted/Impaired naming/Impaired repetition
Impaired naming/Impaired repetition
No abnormalities noted/Impaired naming/Impaired repetition
Impaired naming/Impaired repetition
Impaired naming/Impaired repetition

## 2023-04-14 NOTE — BH INPATIENT PSYCHIATRY PROGRESS NOTE - NSTXCOPEDATEEST_PSY_ALL_CORE
06-Apr-2023
29-Mar-2023
13-Apr-2023
06-Apr-2023
13-Apr-2023
24-Mar-2023
24-Mar-2023
29-Mar-2023
24-Mar-2023
29-Mar-2023
06-Apr-2023
29-Mar-2023
29-Mar-2023
06-Apr-2023
24-Mar-2023
06-Apr-2023

## 2023-04-14 NOTE — BH DISCHARGE NOTE NURSING/SOCIAL WORK/PSYCH REHAB - [facility]
Diagnostic PSG completed per provider order.  Patient did not meet criteria for PAP therapy.  
Unit Name: [address] [place] [address] Unit Phone Number: [phone]

## 2023-04-14 NOTE — BH INPATIENT PSYCHIATRY PROGRESS NOTE - NSTXCOPEPROGRES_PSY_ALL_CORE
No Change
Improving
No Change
Improving
No Change
Improving

## 2023-04-15 VITALS — TEMPERATURE: 98 F | RESPIRATION RATE: 18 BRPM | OXYGEN SATURATION: 100 % | WEIGHT: 205.91 LBS

## 2023-04-15 PROCEDURE — 99239 HOSP IP/OBS DSCHRG MGMT >30: CPT

## 2023-04-15 RX ORDER — TRAZODONE HCL 50 MG
1 TABLET ORAL
Qty: 15 | Refills: 0
Start: 2023-04-15 | End: 2023-04-29

## 2023-04-15 RX ORDER — HALOPERIDOL DECANOATE 100 MG/ML
1 INJECTION INTRAMUSCULAR
Qty: 15 | Refills: 0
Start: 2023-04-15 | End: 2023-04-29

## 2023-04-15 RX ORDER — DIVALPROEX SODIUM 500 MG/1
1 TABLET, DELAYED RELEASE ORAL
Qty: 30 | Refills: 0
Start: 2023-04-15 | End: 2023-04-29

## 2023-04-15 RX ADMIN — DIVALPROEX SODIUM 250 MILLIGRAM(S): 500 TABLET, DELAYED RELEASE ORAL at 09:51

## 2023-04-16 NOTE — CHART NOTE - NSCHARTNOTEFT_GEN_A_CORE
Writer contacted patient to follow up after discharge. Direct contact made with patient and his mother. Patient reported he made it home safely, denies SI/HI. They had questions about medication and Dr. Navarrete was able to advise. Reviewed aftercare information and patient is set for follow up with [facility] on 4/18. Informed the family to call back with any concerns.

## 2023-04-18 DIAGNOSIS — F20.1 DISORGANIZED SCHIZOPHRENIA: ICD-10-CM

## 2023-04-18 DIAGNOSIS — F10.10 ALCOHOL ABUSE, UNCOMPLICATED: ICD-10-CM

## 2023-04-18 DIAGNOSIS — F12.10 CANNABIS ABUSE, UNCOMPLICATED: ICD-10-CM

## 2023-09-22 ENCOUNTER — OFFICE (OUTPATIENT)
Dept: URBAN - METROPOLITAN AREA CLINIC 94 | Facility: CLINIC | Age: 28
Setting detail: OPHTHALMOLOGY
End: 2023-09-22
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
## 2023-09-22 PROCEDURE — NO SHOW FE NO SHOW FEE: Performed by: OPHTHALMOLOGY

## 2023-10-13 NOTE — ED PROVIDER NOTE - CADM POA CENTRAL LINE
Go for blood tests as directed. Your doctor will do lab tests at regular visits to monitor the effects of this medicine. Please follow up with your doctor and keep your health care provider appointments. No

## 2024-05-15 NOTE — BH INPATIENT PSYCHIATRY ASSESSMENT NOTE - NSBHMSEBEHAV_PSY_A_CORE
Pharmacy requested refills that are already active on file. Refused request to pharmacy.   Uncooperative